# Patient Record
Sex: FEMALE | Race: WHITE | Employment: OTHER | URBAN - METROPOLITAN AREA
[De-identification: names, ages, dates, MRNs, and addresses within clinical notes are randomized per-mention and may not be internally consistent; named-entity substitution may affect disease eponyms.]

---

## 2019-04-03 ENCOUNTER — HOSPITAL ENCOUNTER (OUTPATIENT)
Age: 84
Setting detail: OBSERVATION
Discharge: HOME | End: 2019-04-03
Attending: EMERGENCY MEDICINE | Admitting: HOSPITALIST
Payer: MEDICARE

## 2019-04-03 ENCOUNTER — APPOINTMENT (OUTPATIENT)
Dept: GENERAL RADIOLOGY | Age: 84
End: 2019-04-03
Payer: MEDICARE

## 2019-04-03 VITALS
SYSTOLIC BLOOD PRESSURE: 113 MMHG | HEIGHT: 62 IN | BODY MASS INDEX: 27.55 KG/M2 | HEART RATE: 82 BPM | WEIGHT: 149.69 LBS | OXYGEN SATURATION: 96 % | TEMPERATURE: 98 F | DIASTOLIC BLOOD PRESSURE: 63 MMHG | RESPIRATION RATE: 18 BRPM

## 2019-04-03 DIAGNOSIS — R07.9 CHEST PAIN, UNSPECIFIED TYPE: Primary | ICD-10-CM

## 2019-04-03 LAB
A/G RATIO: 1.6 (ref 1.1–2.2)
ALBUMIN SERPL-MCNC: 4.2 G/DL (ref 3.4–5)
ALP BLD-CCNC: 81 U/L (ref 40–129)
ALT SERPL-CCNC: 16 U/L (ref 10–40)
ANION GAP SERPL CALCULATED.3IONS-SCNC: 12 MMOL/L (ref 3–16)
AST SERPL-CCNC: 18 U/L (ref 15–37)
BASOPHILS ABSOLUTE: 0.1 K/UL (ref 0–0.2)
BASOPHILS RELATIVE PERCENT: 0.8 %
BILIRUB SERPL-MCNC: 0.5 MG/DL (ref 0–1)
BUN BLDV-MCNC: 17 MG/DL (ref 7–20)
CALCIUM SERPL-MCNC: 9.3 MG/DL (ref 8.3–10.6)
CHLORIDE BLD-SCNC: 101 MMOL/L (ref 99–110)
CO2: 26 MMOL/L (ref 21–32)
CREAT SERPL-MCNC: 0.7 MG/DL (ref 0.6–1.2)
EOSINOPHILS ABSOLUTE: 0.4 K/UL (ref 0–0.6)
EOSINOPHILS RELATIVE PERCENT: 4.8 %
GFR AFRICAN AMERICAN: >60
GFR NON-AFRICAN AMERICAN: >60
GLOBULIN: 2.6 G/DL
GLUCOSE BLD-MCNC: 103 MG/DL (ref 70–99)
HCT VFR BLD CALC: 38.9 % (ref 36–48)
HEMOGLOBIN: 12.8 G/DL (ref 12–16)
LV EF: 85 %
LVEF MODALITY: NORMAL
LYMPHOCYTES ABSOLUTE: 2.9 K/UL (ref 1–5.1)
LYMPHOCYTES RELATIVE PERCENT: 32.5 %
MCH RBC QN AUTO: 30.5 PG (ref 26–34)
MCHC RBC AUTO-ENTMCNC: 32.8 G/DL (ref 31–36)
MCV RBC AUTO: 93.1 FL (ref 80–100)
MONOCYTES ABSOLUTE: 1.1 K/UL (ref 0–1.3)
MONOCYTES RELATIVE PERCENT: 11.8 %
NEUTROPHILS ABSOLUTE: 4.5 K/UL (ref 1.7–7.7)
NEUTROPHILS RELATIVE PERCENT: 50.1 %
PDW BLD-RTO: 13.9 % (ref 12.4–15.4)
PLATELET # BLD: 212 K/UL (ref 135–450)
PMV BLD AUTO: 7.5 FL (ref 5–10.5)
POTASSIUM REFLEX MAGNESIUM: 3.7 MMOL/L (ref 3.5–5.1)
RBC # BLD: 4.18 M/UL (ref 4–5.2)
SODIUM BLD-SCNC: 139 MMOL/L (ref 136–145)
TOTAL PROTEIN: 6.8 G/DL (ref 6.4–8.2)
TROPONIN: <0.01 NG/ML
WBC # BLD: 9 K/UL (ref 4–11)

## 2019-04-03 PROCEDURE — 6370000000 HC RX 637 (ALT 250 FOR IP): Performed by: HOSPITALIST

## 2019-04-03 PROCEDURE — 85025 COMPLETE CBC W/AUTO DIFF WBC: CPT

## 2019-04-03 PROCEDURE — 93017 CV STRESS TEST TRACING ONLY: CPT

## 2019-04-03 PROCEDURE — G0378 HOSPITAL OBSERVATION PER HR: HCPCS

## 2019-04-03 PROCEDURE — A9502 TC99M TETROFOSMIN: HCPCS | Performed by: HOSPITALIST

## 2019-04-03 PROCEDURE — 93010 ELECTROCARDIOGRAM REPORT: CPT | Performed by: INTERNAL MEDICINE

## 2019-04-03 PROCEDURE — 36415 COLL VENOUS BLD VENIPUNCTURE: CPT

## 2019-04-03 PROCEDURE — 71045 X-RAY EXAM CHEST 1 VIEW: CPT

## 2019-04-03 PROCEDURE — 80053 COMPREHEN METABOLIC PANEL: CPT

## 2019-04-03 PROCEDURE — 3430000000 HC RX DIAGNOSTIC RADIOPHARMACEUTICAL: Performed by: HOSPITALIST

## 2019-04-03 PROCEDURE — 6360000002 HC RX W HCPCS: Performed by: HOSPITALIST

## 2019-04-03 PROCEDURE — 99285 EMERGENCY DEPT VISIT HI MDM: CPT

## 2019-04-03 PROCEDURE — 96372 THER/PROPH/DIAG INJ SC/IM: CPT

## 2019-04-03 PROCEDURE — 78452 HT MUSCLE IMAGE SPECT MULT: CPT

## 2019-04-03 PROCEDURE — 84484 ASSAY OF TROPONIN QUANT: CPT

## 2019-04-03 PROCEDURE — 93005 ELECTROCARDIOGRAM TRACING: CPT | Performed by: EMERGENCY MEDICINE

## 2019-04-03 PROCEDURE — 2580000003 HC RX 258: Performed by: HOSPITALIST

## 2019-04-03 PROCEDURE — 94760 N-INVAS EAR/PLS OXIMETRY 1: CPT

## 2019-04-03 PROCEDURE — 6370000000 HC RX 637 (ALT 250 FOR IP): Performed by: EMERGENCY MEDICINE

## 2019-04-03 RX ORDER — ONDANSETRON 2 MG/ML
4 INJECTION INTRAMUSCULAR; INTRAVENOUS EVERY 6 HOURS PRN
Status: DISCONTINUED | OUTPATIENT
Start: 2019-04-03 | End: 2019-04-03 | Stop reason: HOSPADM

## 2019-04-03 RX ORDER — LEVOTHYROXINE SODIUM 0.12 MG/1
125 TABLET ORAL DAILY
Status: DISCONTINUED | OUTPATIENT
Start: 2019-04-03 | End: 2019-04-03 | Stop reason: DRUGHIGH

## 2019-04-03 RX ORDER — ASPIRIN 81 MG/1
324 TABLET, CHEWABLE ORAL ONCE
Status: COMPLETED | OUTPATIENT
Start: 2019-04-03 | End: 2019-04-03

## 2019-04-03 RX ORDER — SODIUM CHLORIDE 0.9 % (FLUSH) 0.9 %
10 SYRINGE (ML) INJECTION PRN
Status: DISCONTINUED | OUTPATIENT
Start: 2019-04-03 | End: 2019-04-03 | Stop reason: HOSPADM

## 2019-04-03 RX ORDER — ATORVASTATIN CALCIUM 40 MG/1
40 TABLET, FILM COATED ORAL NIGHTLY
Status: DISCONTINUED | OUTPATIENT
Start: 2019-04-03 | End: 2019-04-03 | Stop reason: HOSPADM

## 2019-04-03 RX ORDER — SODIUM CHLORIDE 0.9 % (FLUSH) 0.9 %
10 SYRINGE (ML) INJECTION EVERY 12 HOURS SCHEDULED
Status: DISCONTINUED | OUTPATIENT
Start: 2019-04-03 | End: 2019-04-03 | Stop reason: HOSPADM

## 2019-04-03 RX ORDER — NITROGLYCERIN 0.4 MG/1
0.4 TABLET SUBLINGUAL EVERY 5 MIN PRN
Status: DISCONTINUED | OUTPATIENT
Start: 2019-04-03 | End: 2019-04-03

## 2019-04-03 RX ORDER — ASPIRIN 81 MG/1
81 TABLET, CHEWABLE ORAL DAILY
Status: DISCONTINUED | OUTPATIENT
Start: 2019-04-04 | End: 2019-04-03 | Stop reason: HOSPADM

## 2019-04-03 RX ORDER — RALOXIFENE HYDROCHLORIDE 60 MG/1
60 TABLET, FILM COATED ORAL DAILY
Status: DISCONTINUED | OUTPATIENT
Start: 2019-04-03 | End: 2019-04-03 | Stop reason: HOSPADM

## 2019-04-03 RX ORDER — LEVOTHYROXINE SODIUM 0.03 MG/1
25 TABLET ORAL DAILY
Status: DISCONTINUED | OUTPATIENT
Start: 2019-04-03 | End: 2019-04-03 | Stop reason: HOSPADM

## 2019-04-03 RX ADMIN — ASPIRIN 81 MG 243 MG: 81 TABLET ORAL at 03:05

## 2019-04-03 RX ADMIN — TETROFOSMIN 30 MILLICURIE: 0.23 INJECTION, POWDER, LYOPHILIZED, FOR SOLUTION INTRAVENOUS at 11:17

## 2019-04-03 RX ADMIN — RALOXIFENE HYDROCHLORIDE 60 MG: 60 TABLET, FILM COATED ORAL at 13:01

## 2019-04-03 RX ADMIN — LEVOTHYROXINE SODIUM 25 MCG: 25 TABLET ORAL at 13:01

## 2019-04-03 RX ADMIN — NITROGLYCERIN 0.4 MG: 0.4 TABLET, ORALLY DISINTEGRATING SUBLINGUAL at 04:04

## 2019-04-03 RX ADMIN — ENOXAPARIN SODIUM 40 MG: 40 INJECTION SUBCUTANEOUS at 13:02

## 2019-04-03 RX ADMIN — SODIUM CHLORIDE, PRESERVATIVE FREE 10 ML: 5 INJECTION INTRAVENOUS at 11:07

## 2019-04-03 RX ADMIN — TETROFOSMIN 10 MILLICURIE: 0.23 INJECTION, POWDER, LYOPHILIZED, FOR SOLUTION INTRAVENOUS at 09:49

## 2019-04-03 RX ADMIN — NITROGLYCERIN 0.4 MG: 0.4 TABLET, ORALLY DISINTEGRATING SUBLINGUAL at 03:57

## 2019-04-03 ASSESSMENT — PAIN DESCRIPTION - DESCRIPTORS
DESCRIPTORS: NAGGING
DESCRIPTORS: PRESSURE
DESCRIPTORS: PRESSURE

## 2019-04-03 ASSESSMENT — PAIN DESCRIPTION - PAIN TYPE
TYPE: ACUTE PAIN

## 2019-04-03 ASSESSMENT — PAIN DESCRIPTION - LOCATION
LOCATION: CHEST
LOCATION: CHEST
LOCATION: STERNUM

## 2019-04-03 ASSESSMENT — PAIN DESCRIPTION - ORIENTATION: ORIENTATION: LOWER

## 2019-04-03 ASSESSMENT — PAIN SCALES - GENERAL
PAINLEVEL_OUTOF10: 2
PAINLEVEL_OUTOF10: 1
PAINLEVEL_OUTOF10: 3
PAINLEVEL_OUTOF10: 2
PAINLEVEL_OUTOF10: 0

## 2019-04-03 ASSESSMENT — HEART SCORE: ECG: 1

## 2019-04-03 NOTE — ED NOTES
Report called to [facility] on PCU. Will transport when room ready.      Conor Barcenas RN  04/03/19 9225

## 2019-04-03 NOTE — H&P
noted.  Peripheral pulses and capillary refill is intact. CBC:   Recent Labs     04/03/19 0312   WBC 9.0   HGB 12.8        BMP:    Recent Labs     04/03/19 0312      K 3.7      CO2 26   BUN 17   CREATININE 0.7   GLUCOSE 103*     Hepatic:   Recent Labs     04/03/19 0312   AST 18   ALT 16   BILITOT 0.5   ALKPHOS 81     Troponin:   Recent Labs     04/03/19 0312   TROPONINI <0.01     BNP: No results for input(s): BNP in the last 72 hours. INR: No results for input(s): INR in the last 72 hours. No results found for: LABA1C        No results for input(s): CKTOTAL in the last 72 hours. -----------------------------------------------------------------  XR CHEST PORTABLE   No acute cardiopulmonary disease. EKG  Normal sinus rhythm        Assessment / Plan     Chest pain, unspecified R07.9  Atypical chest pain  Admit patient to telemetry  Trend cardiac enzymes  If patient rules out for MI   Cardiac stress testing in a.m. Start patient on PPI  Continue patient on aspirin, beta blocker and statin    Pain management  R52  Continue with the IV and oral pain medication          DVT and GI prophylaxis      Full Code      Blanquita Desai M.D    This note was transcribed using [id number]. Please disregard any translational errors.

## 2019-04-03 NOTE — ED PROVIDER NOTES
[facility]  eMERGENCY dEPARTMENT eNCOUnter      Pt Name: Tank Barrett  MRN: [id number]  [place] 1934  Date of evaluation: 4/3/2019  Provider: Dwayne Corona, [address] [place] [address]       Chief Complaint   Patient presents with    Palpitations    Chest Pain         HISTORY OF PRESENT ILLNESS   (Location/Symptom, Timing/Onset, Context/Setting, Quality, Duration, Modifying Factors, Severity)  Note limiting factors. Tank Barrett is a 80 y.o. female who presents to the emergency department with a complaint of midsternal chest pressure at 2230 hrs. while getting ready for bed. She currently rates it a 3/10 intensity. She denies any associated diaphoresis or shortness of breath. Earlier in the evening at around 8 PM she noticed that her heart was possibly beating irregularly and maybe a little bit faster than usual. She has remote history of atrial fibrillation 18 years ago. She is not currently anticoagulated. She does take aspirin 81 mg daily. She denies any prior history of coronary artery disease, hypertension, hyperlipidemia or diabetes. She denies any personal or family history of thromboembolic disease. No leg or calf pain. No recent travel. She denies any cough or cold symptoms. No fever or chills. No vomiting or diarrhea. No abdominal pain. HPI    Nursing Notes were reviewed. REVIEW OF SYSTEMS    (2-9 systems for level 4, 10 or more for level 5)       Constitutional: Negative for fever or chills. HENT: Negative for rhinorrhea and sore throat. Eyes: Negative for redness or drainage. Respiratory: Negative for shortness of breath or dyspnea on exertion. Gastrointestinal: Negative for abdominal pain. Negative for vomiting or diarrhea. Genitourinary: Negative for flank pain. Negative for dysuria. Negative for hematuria. Neurological: Negative for headache. Musculoskeletal:  Negative edema. Hematological: Negative for adenopathy. activity: Not on file   Other Topics Concern    Not on file   Social History Narrative    Not on file       SCREENINGS             PHYSICAL EXAM    (up to 7 for level 4, 8 or more for level 5)     ED Triage Vitals [04/03/19 0252]   BP Temp Temp src Pulse Resp SpO2 Height Weight   (!) 153/84 -- -- 96 14 98 % 5' 2\" (1.575 m) 149 lb 11.1 oz (67.9 kg)       Physical Exam   Constitutional: Awake and alert and oriented to person place and time. No apparent distress. Head: No visible evidence of trauma. Normocephalic. Eyes: Pupils equal and reactive. No photophobia. Conjunctiva normal.    HENT: Oral mucosa moist.  Airway patent. Neck:  Soft and supple. Heart:  Regular rate and rhythm. No murmur. Lungs:  Clear to auscultation. No wheezes, rales, or ronchi. No conversational dyspnea or accessory muscle use. Chest: Chest wall non-tender. No evidence of trauma. Abdomen:  Soft, nondistended, bowel sounds present. Nontender. No guarding rigidity or rebound. No masses. Musculoskeletal: Extremities non-tender with full range of motion. radial and dorsalis pedis pulses were equal bilaterally. No calf tenderness erythema or edema. Neurological: Alert and oriented x 3. Speech clear. Cranial nerves II-XII intact. No facial droop. No acute focal motor or sensory deficits. Skin: Skin is warm and dry. No rash. Lymphatic:  No lympadenopathy. Psychiatric: Normal mood and affect. Behavior is normal.         DIAGNOSTIC RESULTS     EKG: All EKG's are interpreted by the Emergency Department Physician who either signs or Co-signs this chart in the absence of a cardiologist.    Normal sinus rhythm. Rate 85. MN interval 144 ms.  ms. R axis -63°. There is no ST elevation. Poor R-wave progression was noted. Q waves noted in the anterior septal leads. No acute change.     RADIOLOGY:   Non-plain film images such as CT, Ultrasound and MRI are read by the radiologist. Plain radiographic images are visualized and preliminarily interpreted by the emergency physician with the below findings:        Interpretation per the Radiologist below, if available at the time of this note:    XR CHEST PORTABLE   Final Result   No acute cardiopulmonary disease. ED BEDSIDE ULTRASOUND:   Performed by ED Physician - none    LABS:  Labs Reviewed   COMPREHENSIVE METABOLIC PANEL W/ REFLEX TO MG FOR LOW K - Abnormal; Notable for the following components:       Result Value    Glucose 103 (*)     All other components within normal limits    Narrative:     Performed at:  [facility] [address] [place], [place] [address]   Phone [phone]   CBC WITH AUTO DIFFERENTIAL    Narrative:     Performed at:  [address] [address]   Phone [phone]   TROPONIN    Narrative:     Performed at:  [address] [address]   Phone [phone]       All other labs were within normal range or not returned as of this dictation. EMERGENCY DEPARTMENT COURSE and DIFFERENTIAL DIAGNOSIS/MDM:   Vitals:    Vitals:    04/03/19 0252 04/03/19 0400   BP: (!) 153/84 129/80   Pulse: 96 90   Resp: 14 19   SpO2: 98% 100%   Weight: 149 lb 11.1 oz (67.9 kg)    Height: 5' 2\" (1.575 m)        The patient presented with midsternal chest pressure that began at 2230 hrs., possibly associated with some palpitations. She is currently in sinus rhythm. At time of my examination she rated her chest pressure 3/10 intensity. She was given aspirin 324 g by mouth and was given a trial of nitroglycerin. EKG reveals normal sinus rhythm with no acute change. MDM      REASSESSMENT          The patient was reexamined. She had relief of her chest discomfort with nitroglycerin. Her symptoms are suspicious for ischemic chest pain. She is currently pain-free. Chest x-ray is unremarkable.  Initial troponin is negative. She will be admitted for further treatment and evaluation of her chest discomfort. A call was placed to the hospitalist on-call for admission. CRITICAL CARE TIME   Total Critical Care time was 0 minutes, excluding separately reportable procedures. There was a high probability of clinically significant/life threatening deterioration in the patient's condition which required my urgent intervention. CONSULTS:  None    PROCEDURES:  Unless otherwise noted below, none     Procedures        FINAL IMPRESSION      1. Chest pain, unspecified type          DISPOSITION/PLAN   DISPOSITION        PATIENT REFERRED TO:  No follow-up provider specified. DISCHARGE MEDICATIONS:  New Prescriptions    No medications on file     Controlled Substances Monitoring:     No flowsheet data found. (Please note that portions of this note were completed with a voice recognition program.  Efforts were made to edit the dictations but occasionally words are mis-transcribed. )    [id number] Isaiah Corona DO (electronically signed)  Attending Emergency Physician          Dave Robbins DO  04/03/19 3969

## 2019-04-03 NOTE — DISCHARGE SUMMARY
Hospital Medicine Discharge Summary      Patient ID: Tyson Bean [id number]     Patient's PCP: Cmlisbet Velarde    Admit Date: 4/3/2019     Discharge Date:       Admitting Physician: Kwesi Glaeas MD    Discharge Physician: Chanell Villanueva MD     Discharge Diagnoses: Active Hospital Problems    Diagnosis Date Noted    Chest pain [R07.9] 04/03/2019         The patient was seen and examined on the day of discharge and this discharge summary is in conjunction with any daily progress note from day of discharge. HOSPITAL COURSE    Patient demographics:  The patient  Bhakti Velarde is a 80 y.o. female     Significant past medical history:   Patient Active Problem List   Diagnosis    Cancer of breast, female [facility])    Chest pain         Presenting symptoms:  Chest pain    Diagnostic workup:      CONSULTS DURING ADMISSION :   None      Patient was diagnosed with:  Noncardiac chest pain      Treatment while inpatient:  Pt presented with chest pain. Pt  was ruled out for acs with ekg and cardiac enzyme criteria. Pt also underwent cardiac stress testing which did not show any evidence of cardiac ischemia. If chest pain continues patient should follow-up with the primary care physician      Discharge Condition:  stable     Discharged to:  Home     Activity:   as tolerated: Follow Up: Follow-up with PCP in 1-2 weeks                                                       Labs:  For convenience and continuity at follow-up the following most recent labs are provided:      CBC:   Lab Results   Component Value Date    WBC 9.0 04/03/2019    HGB 12.8 04/03/2019    HCT 38.9 04/03/2019     04/03/2019       RENAL:   Lab Results   Component Value Date     04/03/2019    K 3.7 04/03/2019     04/03/2019    CO2 26 04/03/2019    BUN 17 04/03/2019    CREATININE 0.7 04/03/2019           Discharge Medications:    Anuel Suazo   Home Medication Instructions [id number]    Printed

## 2019-04-03 NOTE — PLAN OF CARE
Problem: Pain:  Description  Pain management should include both nonpharmacologic and pharmacologic interventions. Goal: Pain level will decrease  Description  Pain level will decrease  Outcome: Ongoing  Goal: Control of acute pain  Description  Control of acute pain  Outcome: Ongoing  Goal: Control of chronic pain  Description  Control of chronic pain  Outcome: Ongoing     Problem: SAFETY  Goal: Free from accidental physical injury  Outcome: Ongoing  Goal: Free from intentional harm  Outcome: Ongoing     Problem: DAILY CARE  Goal: Daily care needs are met  Outcome: Ongoing     Problem: PAIN  Goal: Patient's pain/discomfort is manageable  Outcome: Ongoing     Problem: SKIN INTEGRITY  Goal: Skin integrity is maintained or improved  Outcome: Ongoing     Problem: KNOWLEDGE DEFICIT  Goal: Patient/S.O. demonstrates understanding of disease process, treatment plan, medications, and discharge instructions.   Outcome: Ongoing     Problem: DISCHARGE BARRIERS  Goal: Patient's continuum of care needs are met  Outcome: Ongoing

## 2019-04-04 LAB
EKG ATRIAL RATE: 85 BPM
EKG DIAGNOSIS: NORMAL
EKG P AXIS: 59 DEGREES
EKG P-R INTERVAL: 144 MS
EKG Q-T INTERVAL: 380 MS
EKG QRS DURATION: 76 MS
EKG QTC CALCULATION (BAZETT): 452 MS
EKG R AXIS: -63 DEGREES
EKG T AXIS: 54 DEGREES
EKG VENTRICULAR RATE: 85 BPM

## 2020-07-06 ENCOUNTER — OFFICE VISIT (OUTPATIENT)
Dept: PRIMARY CARE CLINIC | Age: 85
End: 2020-07-06
Payer: MEDICARE

## 2020-07-06 PROCEDURE — G8428 CUR MEDS NOT DOCUMENT: HCPCS | Performed by: NURSE PRACTITIONER

## 2020-07-06 PROCEDURE — G8421 BMI NOT CALCULATED: HCPCS | Performed by: NURSE PRACTITIONER

## 2020-07-06 PROCEDURE — 99211 OFF/OP EST MAY X REQ PHY/QHP: CPT | Performed by: NURSE PRACTITIONER

## 2020-07-08 LAB
SARS-COV-2: NOT DETECTED
SOURCE: NORMAL

## 2020-07-08 NOTE — PROGRESS NOTES
[address] time_______1100_____        Surgery time____________    Take the following medications with a sip of water: Follow your Doctor's pre procedure instructions regarding medications    Do not eat or drink anything after 12:00 midnight prior to your surgery. This includes water chewing gum, mints and ice chips. You may brush your teeth and gargle the morning of your surgery, but do not swallow the water     Please see your family doctor/pediatrician for a history and physical and/or concerning medications. Bring any test results/reports from your physicians office. If you are under the care of a heart doctor or specialist doctor, please be aware that you may be asked to them for clearance    You may be asked to stop blood thinners such as Coumadin, Plavix, Fragmin, Lovenox, etc., or any anti-inflammatories such as:  Aspirin, Ibuprofen, Advil, Naproxen prior to your surgery. We also ask that you stop any OTC medications such as fish oil, vitamin E, glucosamine, garlic, Multivitamins, COQ 10, etc.    We ask that you do not smoke 24 hours prior to surgery  We ask that you do not  drink any alcoholic beverages 24 hours prior to surgery     You must make arrangements for a responsible adult to take you home after your surgery. For your safety you will not be allowed to leave alone or drive yourself home. Your surgery will be cancelled if you do not have a ride home. Also for your safety, it is strongly suggested that someone stay with you the first 24 hours after your surgery. A parent or legal guardian must accompany a child scheduled for surgery and plan to stay at the hospital until the child is discharged. Please do not bring other children with you. For your comfort, please wear simple loose fitting clothing to the hospital.  Please do not bring valuables.     Do not wear any make-up or nail polish on your fingers or toes      For your safety, please do not wear any jewelry or body piercing's on the day of surgery. All jewelry must be removed. If you have dentures, they will be removed before going to operating room. For your convenience, we will provide you with a container. If you wear contact lenses or glasses, they will be removed, please bring a case for them. If you have a living will and a durable power of  for healthcare, please bring in a copy. As part of our patient safety program to minimize surgical site infections, we ask you to do the following:    · Please notify your surgeon if you develop any illness between         now and the  day of your surgery. · This includes a cough, cold, fever, sore throat, nausea,         or vomiting, and diarrhea, etc.  ·  Please notify your surgeon if you experience dizziness, shortness         of breath or blurred vision between now and the time of your surgery. Do not shave your operative site 96 hours prior to surgery. For face and neck surgery, men may use an electric razor 48 hours   prior to surgery. You may shower the night before surgery or the morning of   your surgery with an antibacterial soap. You will need to bring a photo ID and insurance card    [facility] has an onsite pharmacy, would you like to utilize our pharmacy     If you will be staying overnight and use a C-pap machine, please bring   your C-pap to hospital     Our goal is to provide you with excellent care, therefore, visitors will be limited to two(2) in the room at a time so that we may focus on providing this care for you. Please contact pre-admission testing if you have any further questions. [facility] phone number:  [phone] PAT fax number:  [phone]  Please note these are generalized instructions for all surgical cases, you may be provided with more specific instructions according to your surgery.   Preoperative Screening for Elective Surgery/Invasive Procedures While COVID-19 present in the community     Have you tested positive or have been told to self-isolate for COVID-19 like symptoms within the past 28 days? no   Do you currently have any of the following symptoms? o Fever >100.0 F or 99.9 F in immunocompromised patients?no  o New onset cough, shortness of breath or difficulty breathing?no  o New onset sore throat, myalgia (muscle aches and pains), headache, loss of taste/smell or diarrhea? no   Have you had a potential exposure to COVID-19 within the past 14 days by:  o Close contact with a confirmed case?no  o Close contact with a healthcare worker,  or essential infrastructure worker (grocery store, [employer], gas station, public utilities or transportation)? noDo you reside in a congregate setting such as; skilled nursing facility, adult home, correctional facility, homeless shelter or other institutional setting?no  o Have you had recent travel to a known COVID-19 hotspot? no    Indicate if the patient has a positive screen by answering yes to one or more of the above questions. Patients who test positive or screen positive prior to surgery or on the day of surgery should be evaluated in conjunction with the surgeon/proceduralist/anesthesiologist to determine the urgency of the procedure.

## 2020-07-09 ENCOUNTER — ANESTHESIA EVENT (OUTPATIENT)
Dept: OPERATING ROOM | Age: 85
End: 2020-07-09
Payer: MEDICARE

## 2020-07-10 ENCOUNTER — ANESTHESIA (OUTPATIENT)
Dept: OPERATING ROOM | Age: 85
End: 2020-07-10
Payer: MEDICARE

## 2020-07-10 ENCOUNTER — HOSPITAL ENCOUNTER (OUTPATIENT)
Age: 85
Setting detail: OUTPATIENT SURGERY
Discharge: HOME | End: 2020-07-10
Attending: PODIATRIST | Admitting: PODIATRIST
Payer: MEDICARE

## 2020-07-10 ENCOUNTER — APPOINTMENT (OUTPATIENT)
Dept: GENERAL RADIOLOGY | Age: 85
End: 2020-07-10
Attending: PODIATRIST
Payer: MEDICARE

## 2020-07-10 VITALS
OXYGEN SATURATION: 100 % | RESPIRATION RATE: 18 BRPM | HEART RATE: 66 BPM | SYSTOLIC BLOOD PRESSURE: 124 MMHG | BODY MASS INDEX: 26.87 KG/M2 | HEIGHT: 62 IN | DIASTOLIC BLOOD PRESSURE: 87 MMHG | WEIGHT: 146 LBS | TEMPERATURE: 97.2 F

## 2020-07-10 VITALS
TEMPERATURE: 97.7 F | DIASTOLIC BLOOD PRESSURE: 62 MMHG | OXYGEN SATURATION: 99 % | SYSTOLIC BLOOD PRESSURE: 125 MMHG | RESPIRATION RATE: 11 BRPM

## 2020-07-10 PROCEDURE — 3700000000 HC ANESTHESIA ATTENDED CARE: Performed by: PODIATRIST

## 2020-07-10 PROCEDURE — 2580000003 HC RX 258: Performed by: ANESTHESIOLOGY

## 2020-07-10 PROCEDURE — 7100000000 HC PACU RECOVERY - FIRST 15 MIN: Performed by: PODIATRIST

## 2020-07-10 PROCEDURE — 2709999900 HC NON-CHARGEABLE SUPPLY: Performed by: PODIATRIST

## 2020-07-10 PROCEDURE — 2720000010 HC SURG SUPPLY STERILE: Performed by: PODIATRIST

## 2020-07-10 PROCEDURE — 2580000003 HC RX 258: Performed by: PODIATRIST

## 2020-07-10 PROCEDURE — 6360000002 HC RX W HCPCS: Performed by: PODIATRIST

## 2020-07-10 PROCEDURE — 2500000003 HC RX 250 WO HCPCS: Performed by: PODIATRIST

## 2020-07-10 PROCEDURE — 6360000002 HC RX W HCPCS: Performed by: NURSE ANESTHETIST, CERTIFIED REGISTERED

## 2020-07-10 PROCEDURE — 3600000014 HC SURGERY LEVEL 4 ADDTL 15MIN: Performed by: PODIATRIST

## 2020-07-10 PROCEDURE — 7100000010 HC PHASE II RECOVERY - FIRST 15 MIN: Performed by: PODIATRIST

## 2020-07-10 PROCEDURE — 3700000001 HC ADD 15 MINUTES (ANESTHESIA): Performed by: PODIATRIST

## 2020-07-10 PROCEDURE — 73630 X-RAY EXAM OF FOOT: CPT

## 2020-07-10 PROCEDURE — C1713 ANCHOR/SCREW BN/BN,TIS/BN: HCPCS | Performed by: PODIATRIST

## 2020-07-10 PROCEDURE — 7100000011 HC PHASE II RECOVERY - ADDTL 15 MIN: Performed by: PODIATRIST

## 2020-07-10 PROCEDURE — 3600000004 HC SURGERY LEVEL 4 BASE: Performed by: PODIATRIST

## 2020-07-10 PROCEDURE — 2500000003 HC RX 250 WO HCPCS: Performed by: NURSE ANESTHETIST, CERTIFIED REGISTERED

## 2020-07-10 PROCEDURE — 7100000001 HC PACU RECOVERY - ADDTL 15 MIN: Performed by: PODIATRIST

## 2020-07-10 DEVICE — SINGLE TROCAR WIRE
Type: IMPLANTABLE DEVICE | Site: FOOT | Status: FUNCTIONAL
Brand: CHARLOTTE

## 2020-07-10 DEVICE — MUC SCREW
Type: IMPLANTABLE DEVICE | Site: FOOT | Status: FUNCTIONAL
Brand: CHARLOTTE

## 2020-07-10 DEVICE — WIRE ORTH 1.1MM DIA 229MM SMOOTH DBL BAYNT TIP S STL K: Type: IMPLANTABLE DEVICE | Site: FOOT | Status: FUNCTIONAL

## 2020-07-10 RX ORDER — MEPERIDINE HYDROCHLORIDE 25 MG/ML
12.5 INJECTION INTRAMUSCULAR; INTRAVENOUS; SUBCUTANEOUS EVERY 5 MIN PRN
Status: DISCONTINUED | OUTPATIENT
Start: 2020-07-10 | End: 2020-07-10 | Stop reason: HOSPADM

## 2020-07-10 RX ORDER — PHENYLEPHRINE HCL IN 0.9% NACL 1 MG/10 ML
SYRINGE (ML) INTRAVENOUS PRN
Status: DISCONTINUED | OUTPATIENT
Start: 2020-07-10 | End: 2020-07-10 | Stop reason: SDUPTHER

## 2020-07-10 RX ORDER — FENTANYL CITRATE 50 UG/ML
25 INJECTION, SOLUTION INTRAMUSCULAR; INTRAVENOUS EVERY 5 MIN PRN
Status: DISCONTINUED | OUTPATIENT
Start: 2020-07-10 | End: 2020-07-10 | Stop reason: HOSPADM

## 2020-07-10 RX ORDER — MORPHINE SULFATE 2 MG/ML
2 INJECTION, SOLUTION INTRAMUSCULAR; INTRAVENOUS EVERY 5 MIN PRN
Status: DISCONTINUED | OUTPATIENT
Start: 2020-07-10 | End: 2020-07-10 | Stop reason: HOSPADM

## 2020-07-10 RX ORDER — ONDANSETRON 2 MG/ML
INJECTION INTRAMUSCULAR; INTRAVENOUS PRN
Status: DISCONTINUED | OUTPATIENT
Start: 2020-07-10 | End: 2020-07-10 | Stop reason: SDUPTHER

## 2020-07-10 RX ORDER — OXYCODONE HYDROCHLORIDE 10 MG/1
10 TABLET ORAL PRN
Status: DISCONTINUED | OUTPATIENT
Start: 2020-07-10 | End: 2020-07-10 | Stop reason: HOSPADM

## 2020-07-10 RX ORDER — SODIUM CHLORIDE 9 MG/ML
INJECTION, SOLUTION INTRAVENOUS CONTINUOUS
Status: DISCONTINUED | OUTPATIENT
Start: 2020-07-10 | End: 2020-07-10 | Stop reason: HOSPADM

## 2020-07-10 RX ORDER — MAGNESIUM HYDROXIDE 1200 MG/15ML
LIQUID ORAL CONTINUOUS PRN
Status: COMPLETED | OUTPATIENT
Start: 2020-07-10 | End: 2020-07-10

## 2020-07-10 RX ORDER — OXYCODONE HYDROCHLORIDE 5 MG/1
5 TABLET ORAL PRN
Status: DISCONTINUED | OUTPATIENT
Start: 2020-07-10 | End: 2020-07-10 | Stop reason: HOSPADM

## 2020-07-10 RX ORDER — DEXAMETHASONE SODIUM PHOSPHATE 4 MG/ML
INJECTION, SOLUTION INTRA-ARTICULAR; INTRALESIONAL; INTRAMUSCULAR; INTRAVENOUS; SOFT TISSUE
Status: COMPLETED | OUTPATIENT
Start: 2020-07-10 | End: 2020-07-10

## 2020-07-10 RX ORDER — DEXAMETHASONE SODIUM PHOSPHATE 4 MG/ML
INJECTION, SOLUTION INTRA-ARTICULAR; INTRALESIONAL; INTRAMUSCULAR; INTRAVENOUS; SOFT TISSUE PRN
Status: DISCONTINUED | OUTPATIENT
Start: 2020-07-10 | End: 2020-07-10 | Stop reason: SDUPTHER

## 2020-07-10 RX ORDER — BUPIVACAINE HYDROCHLORIDE 5 MG/ML
INJECTION, SOLUTION EPIDURAL; INTRACAUDAL
Status: COMPLETED | OUTPATIENT
Start: 2020-07-10 | End: 2020-07-10

## 2020-07-10 RX ORDER — SODIUM CHLORIDE 0.9 % (FLUSH) 0.9 %
10 SYRINGE (ML) INJECTION EVERY 12 HOURS SCHEDULED
Status: DISCONTINUED | OUTPATIENT
Start: 2020-07-10 | End: 2020-07-10 | Stop reason: HOSPADM

## 2020-07-10 RX ORDER — LIDOCAINE HYDROCHLORIDE 20 MG/ML
INJECTION, SOLUTION EPIDURAL; INFILTRATION; INTRACAUDAL; PERINEURAL PRN
Status: DISCONTINUED | OUTPATIENT
Start: 2020-07-10 | End: 2020-07-10 | Stop reason: SDUPTHER

## 2020-07-10 RX ORDER — FENTANYL CITRATE 50 UG/ML
50 INJECTION, SOLUTION INTRAMUSCULAR; INTRAVENOUS EVERY 5 MIN PRN
Status: DISCONTINUED | OUTPATIENT
Start: 2020-07-10 | End: 2020-07-10 | Stop reason: HOSPADM

## 2020-07-10 RX ORDER — SODIUM CHLORIDE 0.9 % (FLUSH) 0.9 %
10 SYRINGE (ML) INJECTION PRN
Status: DISCONTINUED | OUTPATIENT
Start: 2020-07-10 | End: 2020-07-10 | Stop reason: HOSPADM

## 2020-07-10 RX ORDER — PROPOFOL 10 MG/ML
INJECTION, EMULSION INTRAVENOUS CONTINUOUS PRN
Status: DISCONTINUED | OUTPATIENT
Start: 2020-07-10 | End: 2020-07-10 | Stop reason: SDUPTHER

## 2020-07-10 RX ORDER — ONDANSETRON 2 MG/ML
4 INJECTION INTRAMUSCULAR; INTRAVENOUS
Status: DISCONTINUED | OUTPATIENT
Start: 2020-07-10 | End: 2020-07-10 | Stop reason: HOSPADM

## 2020-07-10 RX ORDER — MORPHINE SULFATE 2 MG/ML
1 INJECTION, SOLUTION INTRAMUSCULAR; INTRAVENOUS EVERY 5 MIN PRN
Status: DISCONTINUED | OUTPATIENT
Start: 2020-07-10 | End: 2020-07-10 | Stop reason: HOSPADM

## 2020-07-10 RX ADMIN — ONDANSETRON 4 MG: 2 INJECTION INTRAMUSCULAR; INTRAVENOUS at 13:55

## 2020-07-10 RX ADMIN — CEFAZOLIN 2 G: 10 INJECTION, POWDER, FOR SOLUTION INTRAVENOUS at 12:43

## 2020-07-10 RX ADMIN — Medication 50 MCG: at 13:07

## 2020-07-10 RX ADMIN — Medication 50 MCG: at 13:15

## 2020-07-10 RX ADMIN — DEXAMETHASONE SODIUM PHOSPHATE 4 MG: 4 INJECTION, SOLUTION INTRAMUSCULAR; INTRAVENOUS at 12:51

## 2020-07-10 RX ADMIN — PROPOFOL 200 MCG/KG/MIN: 10 INJECTION, EMULSION INTRAVENOUS at 12:41

## 2020-07-10 RX ADMIN — Medication 50 MCG: at 14:07

## 2020-07-10 RX ADMIN — LIDOCAINE HYDROCHLORIDE 80 MG: 20 INJECTION, SOLUTION EPIDURAL; INFILTRATION; INTRACAUDAL; PERINEURAL at 12:41

## 2020-07-10 RX ADMIN — SODIUM CHLORIDE: 9 INJECTION, SOLUTION INTRAVENOUS at 11:38

## 2020-07-10 RX ADMIN — SODIUM CHLORIDE: 9 INJECTION, SOLUTION INTRAVENOUS at 14:07

## 2020-07-10 ASSESSMENT — PULMONARY FUNCTION TESTS
PIF_VALUE: 0

## 2020-07-10 ASSESSMENT — PAIN SCALES - GENERAL
PAINLEVEL_OUTOF10: 0
PAINLEVEL_OUTOF10: 0

## 2020-07-10 ASSESSMENT — PAIN - FUNCTIONAL ASSESSMENT: PAIN_FUNCTIONAL_ASSESSMENT: 0-10

## 2020-07-10 NOTE — ANESTHESIA POSTPROCEDURE EVALUATION
Department of Anesthesiology  Postprocedure Note    Patient: Elmer Garcia  MRN: [id number]  YOB: 1934  Date of evaluation: 7/10/2020  Time:  2:57 PM     Procedure Summary     Date:  07/10/20 Room / Location:  [facility] OR [address] [place] [address]    Anesthesia Start:  9536 Anesthesia Stop:  0584    Procedure:  MODIFIED NILDA SCARF BUNIONECTOMY RIGHT FOOT, DISTAL METATARSAL OSTEOTOMY SECOND RIGHT, FLEXOR TENOTOMY THIRD AND FOURTH RIGHT, PROXIMAL INTERPROLONGED JOINT ARTHRODISIS RIGHT FOOT (Right ) Diagnosis:       Hallux valgus, right      Hammer toe of right foot      Toe pain, right      (HALLUX VALGUS RIGHT FOOT, HAMMERTOES SECOND, THIRD, FOURTH RIGHT FOOT, PAIN RIGHT TOES)    Surgeon:  Nat Red DPM Responsible Provider:  Zoey Evans MD    Anesthesia Type:  MAC ASA Status:  3          Anesthesia Type: MAC    Penny Phase I: Penny Score: 7    Penny Phase II:      Last vitals: Reviewed and per EMR flowsheets.        Anesthesia Post Evaluation    Patient location during evaluation: PACU  Patient participation: complete - patient participated  Level of consciousness: awake and alert  Pain score: 0  Airway patency: patent  Nausea & Vomiting: no nausea and no vomiting  Complications: no  Cardiovascular status: blood pressure returned to baseline  Respiratory status: acceptable  Hydration status: euvolemic

## 2020-07-10 NOTE — PROGRESS NOTES
Pt up to chair, tolerating well. VSS. Dressing clean dry and intact. Pt states no pain. Continue to monitor.

## 2020-07-10 NOTE — PROGRESS NOTES
Pt to ph 2 from PACU. VSS. Tanvir Fell. Dressing clean dry and intact. Pt states no pain. Warm leg. Continue to monitor.

## 2020-07-10 NOTE — BRIEF OP NOTE
Brief Postoperative Note      Patient: Doris See  YOB: 1934  MRN: [id number]    Date of Procedure: 7/10/2020    Pre-Op Diagnosis: HALLUX VALGUS RIGHT FOOT,  ELONGATED 2ND MT RT, HAMMERTOES SECOND, THIRD, FOURTH, FIFTH RIGHT FOOT, PAIN RIGHT TOES    Post-Op Diagnosis: Same       Procedure(s):  MODIFIED NILDA SCARF BUNIONECTOMY RIGHT FOOT, DISTAL METATARSAL OSTEOTOMY SECOND RIGHT, FLEXOR TENOTOMY THIRD AND FOURTH RIGHT, PROXIMAL INTERPROLONGED JOINT ARTHRODISIS RIGHT FOOT    Surgeon(s):  Anca Lockhart DPM    Assistant:  Surgical Assistant: Nichole Ramos    Anesthesia: Monitor Anesthesia Care    Estimated Blood Loss (mL): Minimal    Complications: None    Specimens:   * No specimens in log *    Implants:  Implant Name Type Inv. Item Serial No.  Lot No. LRB No. Used Action   SCREW COMPRSS MULTIUSE 3.0X12MM Screw/Plate/Nail/Kael SCREW COMPRSS MULTIUSE 3.0X12MM  [employer]  Right 1 Implanted   SCREW COMPRSS MULTIUSE 3.0X14MM Screw/Plate/Nail/Kael SCREW COMPRSS MULTIUSE 3.0X14MM  [employer]  Right 1 Implanted   SCREW COMPRSS MULTIUSE 3.0X22MM Screw/Plate/Nail/Kael SCREW COMPRSS MULTIUSE 3.0X22MM  [employer]  Right 1 Implanted   IMPL KWIRE FIXATION 1MM 150MM  [employer] Screw/Plate/Nail/Kael IMPL KWIRE FIXATION 1MM 150MM  [employer]  [employer]  Right 3 Implanted   IMPL KWIRE FIXATION 1.1MM 22. 9CM ST Screw/Plate/Nail/Kael IMPL KWIRE FIXATION 1.1MM 22. 9CM ST  [facility] [id number] Right 1 Implanted         Drains: * No LDAs found *    Findings: AS EXPECTED.     Electronically signed by Anca Lockhart DPM on 7/10/2020 at 2:19 PM

## 2020-07-10 NOTE — PROGRESS NOTES
Pt wakes easy to v/o. VSS. Pt denies pain and nausea. Dressing RLE cdi. Ice applied. Pt stable ready for phase 2.

## 2020-07-10 NOTE — PROGRESS NOTES
Pt arrived to pacu from or asleep. VSS on monitor. O2 on 4L nc. Dressing right foot cdi elevated ice applied. Good circ checks to right LE. Pt asleep in bed.

## 2020-07-10 NOTE — ANESTHESIA PRE PROCEDURE
Department of Anesthesiology  Preprocedure Note       Name:  Diana Pascual   Age:  80 y.o.  :  1934                                          MRN:  [id number]         Date:  7/10/2020      Surgeon: Cris Hernandez):  Sendy Haile DPM    Procedure: Procedure(s):  MODIFIED NILDA SCARF BUNIONECTOMY RIGHT FOOT, DISTAL METATARSAL OSTEOTOMY SECOND RIGHT, FLEXOR TENOTOMY THIRD AND FOURTH RIGHT  PROXIMAL INTERPROLONGED JOINT ARTHRODISIS RIGHT FOOT    Medications prior to admission:   Prior to Admission medications    Medication Sig Start Date End Date Taking? Authorizing Provider   levothyroxine (SYNTHROID) 25 MCG tablet Take 25 mcg by mouth daily    Yes Historical Provider, MD   raloxifene (EVISTA) 60 MG tablet Take 60 mg by mouth daily. Yes Historical Provider, MD   aspirin 81 MG EC tablet Take 81 mg by mouth daily.       Historical Provider, MD       Current medications:    Current Facility-Administered Medications   Medication Dose Route Frequency Provider Last Rate Last Dose    0.9 % sodium chloride infusion   Intravenous Continuous Kartik Bateman MD 75 mL/hr at 07/10/20 1138      sodium chloride flush 0.9 % injection 10 mL  10 mL Intravenous 2 times per day Kartik Bateman MD        sodium chloride flush 0.9 % injection 10 mL  10 mL Intravenous PRN Kartik Bateman MD        ceFAZolin (ANCEF) 2 g in dextrose 5 % 100 mL IVPB  2 g Intravenous Once Sendy Haile DPM           Allergies:  No Known Allergies    Problem List:    Patient Active Problem List   Diagnosis Code    Cancer of breast, female [facility]) C50.919    Chest pain R07.9       Past Medical History:        Diagnosis Date    Breast cancer ([address]     right    Cancer ([address]     Thyroid disease        Past Surgical History:        Procedure Laterality Date    BREAST LUMPECTOMY Right     COLONOSCOPY         Social History:    Social History     Tobacco Use    Smoking status: Never Smoker    Smokeless tobacco: Never Used   Substance Use Topics    Alcohol use: No                                Counseling given: Not Answered      Vital Signs (Current):   Vitals:    07/08/20 1311 07/10/20 1118   BP:  126/72   Pulse:  88   Resp:  14   Temp:  97.9 °F (36.6 °C)   TempSrc:  Temporal   SpO2:  98%   Weight: 146 lb (66.2 kg)    Height: 5' 2\" (1.575 m)                                               BP Readings from Last 3 Encounters:   07/10/20 126/72   04/03/19 113/63   06/18/14 110/66       NPO Status: Time of last liquid consumption: 2100                        Time of last solid consumption: 2100                        Date of last liquid consumption: 07/09/20                        Date of last solid food consumption: 07/09/20    BMI:   Wt Readings from Last 3 Encounters:   07/08/20 146 lb (66.2 kg)   04/03/19 149 lb 11.1 oz (67.9 kg)   06/18/14 140 lb (63.5 kg)     Body mass index is 26.7 kg/m². CBC:   Lab Results   Component Value Date    WBC 9.0 04/03/2019    RBC 4.18 04/03/2019    HGB 12.8 04/03/2019    HCT 38.9 04/03/2019    MCV 93.1 04/03/2019    RDW 13.9 04/03/2019     04/03/2019       CMP:   Lab Results   Component Value Date     04/03/2019    K 3.7 04/03/2019     04/03/2019    CO2 26 04/03/2019    BUN 17 04/03/2019    CREATININE 0.7 04/03/2019    GFRAA >60 04/03/2019    AGRATIO 1.6 04/03/2019    LABGLOM >60 04/03/2019    GLUCOSE 103 04/03/2019    PROT 6.8 04/03/2019    CALCIUM 9.3 04/03/2019    BILITOT 0.5 04/03/2019    ALKPHOS 81 04/03/2019    AST 18 04/03/2019    ALT 16 04/03/2019       POC Tests: No results for input(s): POCGLU, POCNA, POCK, POCCL, POCBUN, POCHEMO, POCHCT in the last 72 hours.     Coags: No results found for: PROTIME, INR, APTT    HCG (If Applicable): No results found for: PREGTESTUR, PREGSERUM, HCG, HCGQUANT     ABGs: No results found for: PHART, PO2ART, FZB8KQA, UNQ3AVG, BEART, J3LJSALO     Type & Screen (If Applicable):  No results found for: LABABO, LABRH    Drug/Infectious Status (If Applicable):  No results found for: HIV, HEPCAB    COVID-19 Screening (If Applicable):   Lab Results   Component Value Date    COVID19 Not Detected 07/06/2020         Anesthesia Evaluation  Patient summary reviewed and Nursing notes reviewed no history of anesthetic complications:   Airway: Mallampati: II  TM distance: >3 FB   Neck ROM: full  Mouth opening: > = 3 FB Dental:    (+) upper dentures and lower dentures      Pulmonary:Negative Pulmonary ROS                              Cardiovascular:Negative CV ROS  Exercise tolerance: good (>4 METS),           Rhythm: regular                      Neuro/Psych:   Negative Neuro/Psych ROS              GI/Hepatic/Renal: Neg GI/Hepatic/Renal ROS            Endo/Other:    (+) hypothyroidism::., malignancy/cancer (h/o breast cancer). (-) diabetes mellitus, hyperthyroidism, blood dyscrasia, arthritis, no electrolyte abnormalities               Abdominal:           Vascular: negative vascular ROS. Anesthesia Plan      MAC     ASA 3       Induction: intravenous. MIPS: Prophylactic antiemetics administered. Anesthetic plan and risks discussed with patient. Plan discussed with CRNA. Elvie Shaffer MD   7/10/2020  This pre-anesthesia assessment may be used as a history and physical.    DOS STAFF ADDENDUM:    Pt seen and examined, chart reviewed (including anesthesia, drug and allergy history). No interval changes to history and physical examination. Anesthetic plan, risks, benefits, alternatives, and personnel involved discussed with patient. Patient verbalized an understanding and agrees to proceed.       Elvie Shaffer MD  July 10, 2020  11:45 AM

## 2020-07-10 NOTE — PROGRESS NOTES
Discharge instructions reviewed with pt and family. Verbalized understanding. Pt ready for discharge.

## 2020-07-11 NOTE — OP NOTE
[address] [place] [address] [place] [address]                                OPERATIVE REPORT    PATIENT NAME: Zhen Cheatham                      :        1934  MED REC NO:   [id number]                          ROOM:  ACCOUNT NO:   [de-identified]                           ADMIT DATE: 07/10/2020  PROVIDER:     Jim iPzano DPM      DATE OF PROCEDURE:  07/10/2020    PREOPERATIVE DIAGNOSES:  1. Hallux valgus deformity, right. 2.  Elongated second metatarsal, right. 3.  Hammer digit syndrome, second through fifth digits, right. POSTOPERATIVE DIAGNOSES:  1. Hallux valgus deformity, right. 2.  Elongated second metatarsal, right. 3.  Hammer digit syndrome, second through fifth digits, right. OPERATION PERFORMED:  1. Modified Oswaldo (scarf) bunionectomy, right foot. 2.  Distal metatarsal osteotomy, second, right. 3. PIPJ arthrodesis with K-wire fixation, second digit right. 4.  Flexor tenotomy third, fourth, and fifth digits, right. SURGEON:  Jim Pizano DPM    ANESTHESIA:  MAC. HEMOSTASIS:  Ankle tourniquet at 250 mmHg. ESTIMATED BLOOD LOSS:  Less than 5 mL. MATERIALS:  3-0 Vicryl, 4-0 Vicryl, 4-0 Monocryl, 4-0 nylon, three  [employer] 3.0 cannulated compression screws and one 0.045 K-wire. INJECTABLES:  Carbocaine 2%, 0.5% plain Marcaine, and Decadron. COMPLICATIONS:  None. INDICATIONS:  The patient presents to [facility] for surgical correction  of hallux valgus deformity, right; elongated second metatarsal, right;  and hammer digit syndrome, second through fifth digits, right foot. The  patient has had extensive conservative treatment without overall  improvement in symptoms and requests surgical intervention at this time. She was advised of alternative treatment options.   She was also advised  of possible risks and complications associated with the surgery  including infection, delayed healing, swelling, pain, numbness,  recurrence of deformity, flail toe, stiff toe, malunion, nonunion,  hallux varus, need for further surgical intervention or loss of digit or  leg due to vascular or infectious process. The patient understands  these possibilities and requests that surgery be performed as planned. She was given the opportunity to ask any questions and all questions  were answered in a satisfactory manner. OPERATIVE PROCEDURE:  The patient was taken to the operating room via  cart and placed on the table in supine position. She was given IV  sedation by the anesthesia staff. She was also given local anesthetic  block of the right foot consisting of 2% Carbocaine. A well-padded  tourniquet was placed at the level of the right ankle. The patient's  foot was prepped and draped in the usual aseptic technique. An Esmarch  bandage was used to exsanguinate the foot, and the tourniquet was  inflated to 250 mmHg, which provided adequate hemostasis throughout the  entire procedure. Attention was focused to the dorsomedial aspect of the first MPJ, right. The incision was deepened and underscored, taking care to retract all  neurovascular structures and electrocoagulating any bleeders as  necessary. Deep dissection was continued to the level of the first MPJ  capsule. The capsule was exposed both dorsally and medially and a  T-capsulotomy performed. Dissection was continued to expose the distal  first metatarsal.  Upon adequate exposure, there was noted to be  significantly hypertrophic bone at the medial aspect of the first  metatarsal head. This hypertrophic bone was excised in toto using a  sagittal saw. A modified Oswaldo (scarf type) osteotomy was then performed. The distal  first metatarsal was transposed laterally and fixated using two-screw  fixation. First screw was directed from dorsal proximal to plantar  distal and measured 22 mm in length.   The second screw was directed from  dorsal to plantar in a more proximal orientation and was directed from  dorsal to plantar. It measured 14 mm in length. There was also noted  to be a cartilaginous defect in the central medial portion of the first  metatarsal head measuring approximately 3 to 4 mm in diameter. Subchondral drilling was performed at this site. Lateral release of the  adductor tendon was performed. Lengthening of the extensor hallucis  longus tendon was also performed. Medial capsulorrhaphy was performed. The capsular structures were re-approximated using 3-0 Vicryl. The  subcutaneous tissues were re-approximated using 4-0 Vicryl. The skin  was re-approximated using a subcuticular suture of 4-0 Monocryl. Attention was then focused to the dorsal aspect of the foot overlying  the distal second metatarsal.  Linear incision measuring approximately 4  cm in length was made. The incision was deepened and underscored,  taking care to retract all neurovascular structures and  electrocoagulating any bleeders as necessary. Deep dissection was  continued to expose the distal second metatarsal.  Upon adequate  exposure, a Weil-type osteotomy was performed. The head of the  metatarsal was transposed approximately 2 mm proximal and fixated from  dorsal to plantar using a [employer] 3.0 x 12 mm cannulated  compression screw. The resultant dorsal eminence was excised in toto. The area was rasped smooth. The surgical site was copiously irrigated  with antibiotic solution. The site was carefully examined. There was  found to be excellent rigid fixation at the osteotomy site. The deep  tissue and capsular structures were re-approximated using 4-0 Vicryl. The subcutaneous tissues were re-approximated using 4-0 Vicryl. The  skin was re-approximated using a subcuticular suture of 4-0 Monocryl. Attention was then focused to the second digit, which was significantly  dorsally contracted.   A linear incision measuring approximately 2

## 2021-11-26 NOTE — PROGRESS NOTES
Place patient label inside box (if no patient label, complete below)  Name:  :  MR#:   Kymberly [place] / PROCEDURE  1. I (we), Talonia Corey (Patient Name) authorize Binh Herrera MD (Provider / Jasmyn Garcia) and/or such assistants as may be selected by him/her, to perform the following operation/procedure(s): RIGHT HALLUX METATARSOPHALANGEAL JOINT ARTHRODESIS, INTERPHALANGEAL JOINT ARTHRODESIS HALLUX, MULTIPLE METATARSAL OSTEOTOMIES, HAMMER TOE CORRECTION DIGITS 2, 3, 4, 5       Note: If unable to obtain consent prior to an emergent procedure, document the emergent reason in the medical record. This procedure has been explained to my (our) satisfaction and included in the explanation was:  A) The intended benefit, nature, and extent of the procedure to be performed;  B) The significant risks involved and the probability of success;  C) Alternative procedures and methods of treatment;  D) The dangers and probable consequences of such alternatives (including no procedure or treatment); E) The expected consequences of the procedure on my future health;  F) Whether other qualified individuals would be performing important surgical tasks and/or whether  would be present to advise or support the procedure. I (we) understand that there are other risks of infection and other serious complications in the pre-operative/procedural and postoperative/procedural stages of my (our) care. I (we) have asked all of the questions which I (we) thought were important in deciding whether or not to undergo treatment or diagnosis. These questions have been answered to my (our) satisfaction. I (we) understand that no assurance can be given that the procedure will be a success, and no guarantee or warranty of success has been given to me (us).     2. It has been explained to me (us) that during the course of the operation/procedure, unforeseen conditions may be revealed that necessitate extension of the original procedure(s) or different procedure(s) than those set forth in Paragraph 1. I (we) authorize and request that the above-named physician, his/her assistants or his/her designees, perform procedures as necessary and desirable if deemed to be in my (our) best interest.     Revised 8/2/2021                                                                          Page 1 of 2       3. I acknowledge that health care personnel may be observing this procedure for the purpose of medical education or other specified purposes as may be necessary as requested and/or approved by my (our) physician. 4. I (we) consent to the disposal by the hospital Pathologist of the removed tissue, parts or organs in accordance with hospital policy. 5. I do ____ do not ____ consent to the use of a local infiltration pain blocking agent that will be used by my provider/surgical provider to help alleviate pain during my procedure. 6. I do ____ do not ____ consent to an emergent blood transfusion in the case of a life-threatening situation that requires blood components to be administered. This consent is valid for 24 hours from the beginning of the procedure. 7. This patient does ____ or does not ____ currently have a DNR status/order. If DNR order is in place, obtain Addendum to the Surgical Consent for ALL Patients with a DNR Order to address heidy-operative status for limited intervention or DNR suspension.      8. I have read and fully understand the above Consent for Operation/Procedure and that all blanks were completed before I signed the consent.   _____________________________       _____________________      ____/____am/pm  Signature of Patient or legal representative      Printed Name / Relationship            Date / Time   ____________________________       _____________________      ____/____am/pm  Witness to Signature Printed Name                    Date / Time     If patient is unable to sign or is a minor, complete the following)  Patient is a minor, ____ years of age, or unable to sign because:   ______________________________________________________________________________________________    [place] [place] If a phone consent is obtained, consent will be documented by using two health care professionals, each affirming that the consenting party has no questions and gives consent for the procedure discussed with the physician/provider.   _____________________          ____________________       _____/_____am/pm   2nd witness to phone consent        Printed name           Date / Time    Informed Consent:  I have provided the explanation described above in section 1 to the patient and/or legal representative.  I have provided the patient and/or legal representative with an opportunity to ask any questions about the proposed operation/procedure.   ___________________________          ____________________         ____/____am/pm  Provider / Proceduralist                            Printed name            Date / Time  Revised 8/2/2021                                                                      Page 2 of 2

## 2021-11-26 NOTE — PROGRESS NOTES
11-26 H&P done 11/23 with Pippa Devlin [phone] however not completed(see CE) office was closed when I tried to call; patient did confirm she had this done then. -db    11-30 Spoke with Rafal Reynolds at Dr. Stephan Boyce, H&P is not complete in [facility]. Surgery is 12-2, please complte H&P.   /eg    12/1 6402 Shayy Florian, at PCP , will fax EKG. /kayy    12/1 1254 H&P was faxed , but no EKG tracing.  Ciara will refax EKg. Niesha Mode

## 2021-11-26 NOTE — PROGRESS NOTES
[facility] [facility] patients having surgery or anesthesia are required to be Covid tested OR to have been vaccinated at least 14 days prior to your procedure. It is very important to return our call to [phone] and notify the staff of your last vaccination date otherwise you will be required to complete Covid PCR test within the 5-6 days prior to surgery & quarantine. The results will need to be faxed to PreAdmission Testing at [phone]. PRIOR TO PROCEDURE DATE:        1. PLEASE FOLLOW ANY  GUIDELINES/ INSTRUCTIONS PRIOR TO YOUR PROCEDURE AS ADVISED BY YOUR SURGEON. 2. Arrange for someone to drive you home and be with you for the first 24 hours after discharge for your safety after your procedure for which you received sedation. Ensure it is someone we can share information with regarding your discharge. 3. You must contact your surgeon for instructions IF:   You are taking any blood thinners, aspirin, anti-inflammatory or vitamin E.   There is a change in your physical condition such as a cold, fever, rash, cuts, sores or any other infection, especially near your surgical site. 4. Do not drink alcohol the day before or day of your procedure. 5. A Pre-op History and Physical for surgery MUST be completed by your Physician or Urgent Care within 30 days of your procedure date. Please bring a copy with you on the day of your procedure and along with any other testing performed. THE DAY OF YOUR PROCEDURE:  1. Follow instructions for ARRIVAL TIME as DIRECTED BY YOUR SURGEON. 2. Enter the MAIN entrance from [employer] and follow the signs to the free [employer] or [employer] parking (offered free of charge 6am-5pm). 3. Enter the Main Entrance of the hospital (do not enter from the lower level of the parking garage). Upon entrance, check in with the  at the main desk on your left. If no one is available at the desk, proceed into the [facility] Waiting Room and go through the door directly into the [facility]. There is a Check-in desk ACROSS from Room 5 (marked with a sign hanging from the ceiling). The phone number for the surgery center is [phone]. 4. Please call [phone] option #2 option #2 if you have not been preregistered yet. On the day of your procedure bring your insurance card and photo ID. You will be registered at your bedside once brought back to your room. 5. DO NOT EAT ANYTHING eight hours prior to your arrival for surgery. May have 8 ounces of water 4 hours prior to your arrival for surgery. NOTE: ALL Gastric, Bariatric and Bowel surgery patients MUST follow their surgeon's instructions. 6. MEDICATIONS    Take the following medications with a SMALL sip of water: synthroid   Bariatric patient's call surgeon if on diabetic medications as some need to be stopped 1 week preop   Use your usual dose of inhalers the morning of surgery. BRING your rescue inhaler with you to hospital.    Anesthesia does NOT want you to take insulin the morning of surgery. They will control your blood sugar while you are at the hospital. Please contact your ordering physician for instructions regarding your insulin the night before your procedure. If you have an insulin pump, please keep it set on basal rate. 7. Do not swallow water when brushing teeth. No gum, candy, mints or ice chips. Refrain from smoking or at least decrease the amount. 8. Dress in loose, comfortable clothing appropriate for redressing after your procedure. Do not wear jewelry (including body piercings), make-up (especially NO eye make-up), fingernail polish (NO toenail polish if foot/leg surgery), lotion, powders or metal hairclips. 9. Dentures, glasses, or contacts will need to be removed before your procedure.  Bring cases for your glasses, contacts, dentures, or hearing aids to protect them while you are in surgery. 10. If you use a CPAP, please bring it with you on the day of your procedure. 11. We recommend that valuable personal  belongings such as cash, cell phones, e-tablets or jewelry, be left at home during your stay. The hospital will not be responsible for valuables that are not secured in the hospital safe. However, if your insurance requires a co-pay, you may want to bring a method of payment, i.e. Check or credit card, if you wish to pay your co-pay the day of surgery. 12. If you are to stay overnight, you may bring a bag with personal items. Please have any large items you may need brought in by your family after your arrival to your hospital room. 15. If you have a Living Will or Durable Power of , please bring a copy on the day of your procedure. 15. With your permission, one family member may accompany you while you are being prepared for surgery. Once you are ready, additional family members may join you. HOW WE KEEP YOU SAFE and WORK TO PREVENT SURGICAL SITE INFECTIONS:  1. Health care workers should always check your ID bracelet to verify your name and birth date. You will be asked many times to state your name, date of birth, and allergies. 2. Health care workers should always clean their hands with soap or alcohol gel before providing care to you. It is okay to ask anyone if they cleaned their hands before they touch you. 3. You will be actively involved in verifying the type of procedure you are having and ensuring the correct surgical site. This will be confirmed multiple times prior to your procedure. Do NOT alex your surgery site UNLESS instructed to by your surgeon. 4. Do not shave or wax for 72 hours prior to procedure near your operative site. Shaving with a razor can irritate your skin and make it easier to develop an infection.  On the day of your procedure, any hair that needs to be removed near the surgical site will be clipped by a healthcare worker using a special clippers designed to avoid skin irritation. 5. When you are in the operating room, your surgical site will be cleansed with a special soap, and in most cases, you will be given an antibiotic before the surgery begins. What to expect AFTER YOUR PROCEDURE:  1. Immediately following your procedure, your will be taken to the PACU for the first phase of your recovery. Your nurse will help you recover from any potential side effects of anesthesia, such as extreme drowsiness, changes in your vital signs or breathing patterns. Nausea, headache, muscle aches, or sore throat may also occur after anesthesia. Your nurse will help you manage these potential side effects. 2. For comfort and safety, arrange to have someone at home with you for the first 24 hours after discharge. 3. You and your family will be given written instructions about your diet, activity, dressing care, medications, and return visits. 4. Once at home, should issues with nausea, pain, or bleeding occur, or should you notice any signs of infection, you should call your surgeon. 5. Always clean your hands before and after caring for your wound. Do not let your family touch your surgery site without cleaning their hands. 6. Narcotic pain medications can cause significant constipation. You may want to add a stool softener to your postoperative medication schedule or speak to your surgeon on how best to manage this SIDE EFFECT. SPECIAL INSTRUCTIONS patient acknowledges understanding of pre op instructions     Thank you for allowing us to care for you. We strive to exceed your expectations in the delivery of care and service provided to you and your family. If you need to contact the [facility] [place] [address] staff for any reason, please call us at [phone]    Instructions reviewed with patient during preadmission testing phone interview.   [address], RN.11/26/2021 .8:35 AM      ADDITIONAL EDUCATIONAL INFORMATION REVIEWED PER PHONE WITH YOU AND/OR YOUR FAMILY:  Yes Hibiclens® Bathing Instructions   No Antibacterial Soap

## 2021-12-01 ENCOUNTER — ANESTHESIA EVENT (OUTPATIENT)
Dept: OPERATING ROOM | Age: 86
End: 2021-12-01
Payer: MEDICARE

## 2021-12-02 ENCOUNTER — ANESTHESIA (OUTPATIENT)
Dept: OPERATING ROOM | Age: 86
End: 2021-12-02
Payer: MEDICARE

## 2021-12-02 ENCOUNTER — HOSPITAL ENCOUNTER (OUTPATIENT)
Age: 86
Setting detail: OUTPATIENT SURGERY
Discharge: HOME | End: 2021-12-02
Attending: ORTHOPAEDIC SURGERY | Admitting: ORTHOPAEDIC SURGERY
Payer: MEDICARE

## 2021-12-02 VITALS
TEMPERATURE: 97 F | HEART RATE: 78 BPM | BODY MASS INDEX: 25.76 KG/M2 | RESPIRATION RATE: 14 BRPM | OXYGEN SATURATION: 97 % | SYSTOLIC BLOOD PRESSURE: 126 MMHG | WEIGHT: 140 LBS | DIASTOLIC BLOOD PRESSURE: 73 MMHG | HEIGHT: 62 IN

## 2021-12-02 VITALS — OXYGEN SATURATION: 99 % | SYSTOLIC BLOOD PRESSURE: 103 MMHG | DIASTOLIC BLOOD PRESSURE: 58 MMHG

## 2021-12-02 LAB
ANION GAP SERPL CALCULATED.3IONS-SCNC: 10 MMOL/L (ref 3–16)
BUN BLDV-MCNC: 13 MG/DL (ref 7–20)
CALCIUM SERPL-MCNC: 8.9 MG/DL (ref 8.3–10.6)
CHLORIDE BLD-SCNC: 103 MMOL/L (ref 99–110)
CO2: 27 MMOL/L (ref 21–32)
CREAT SERPL-MCNC: 0.8 MG/DL (ref 0.6–1.2)
EKG ATRIAL RATE: 78 BPM
EKG DIAGNOSIS: NORMAL
EKG P AXIS: 68 DEGREES
EKG P-R INTERVAL: 146 MS
EKG Q-T INTERVAL: 416 MS
EKG QRS DURATION: 82 MS
EKG QTC CALCULATION (BAZETT): 474 MS
EKG R AXIS: -70 DEGREES
EKG T AXIS: 48 DEGREES
EKG VENTRICULAR RATE: 78 BPM
GFR AFRICAN AMERICAN: >60
GFR NON-AFRICAN AMERICAN: >60
GLUCOSE BLD-MCNC: 84 MG/DL (ref 70–99)
GLUCOSE BLD-MCNC: 85 MG/DL (ref 70–99)
HCT VFR BLD CALC: 40 % (ref 36–48)
HEMOGLOBIN: 13.2 G/DL (ref 12–16)
MCH RBC QN AUTO: 31.1 PG (ref 26–34)
MCHC RBC AUTO-ENTMCNC: 33 G/DL (ref 31–36)
MCV RBC AUTO: 94.2 FL (ref 80–100)
PDW BLD-RTO: 13.9 % (ref 12.4–15.4)
PERFORMED ON: NORMAL
PLATELET # BLD: 214 K/UL (ref 135–450)
PMV BLD AUTO: 7.5 FL (ref 5–10.5)
POTASSIUM SERPL-SCNC: 3.9 MMOL/L (ref 3.5–5.1)
RBC # BLD: 4.24 M/UL (ref 4–5.2)
SODIUM BLD-SCNC: 140 MMOL/L (ref 136–145)
WBC # BLD: 8.6 K/UL (ref 4–11)

## 2021-12-02 PROCEDURE — 3700000000 HC ANESTHESIA ATTENDED CARE: Performed by: ORTHOPAEDIC SURGERY

## 2021-12-02 PROCEDURE — 2580000003 HC RX 258: Performed by: ORTHOPAEDIC SURGERY

## 2021-12-02 PROCEDURE — 2720000010 HC SURG SUPPLY STERILE: Performed by: ORTHOPAEDIC SURGERY

## 2021-12-02 PROCEDURE — 85027 COMPLETE CBC AUTOMATED: CPT

## 2021-12-02 PROCEDURE — C1713 ANCHOR/SCREW BN/BN,TIS/BN: HCPCS | Performed by: ORTHOPAEDIC SURGERY

## 2021-12-02 PROCEDURE — C1769 GUIDE WIRE: HCPCS | Performed by: ORTHOPAEDIC SURGERY

## 2021-12-02 PROCEDURE — 2580000003 HC RX 258: Performed by: ANESTHESIOLOGY

## 2021-12-02 PROCEDURE — 2500000003 HC RX 250 WO HCPCS: Performed by: ORTHOPAEDIC SURGERY

## 2021-12-02 PROCEDURE — 7100000011 HC PHASE II RECOVERY - ADDTL 15 MIN: Performed by: ORTHOPAEDIC SURGERY

## 2021-12-02 PROCEDURE — 7100000010 HC PHASE II RECOVERY - FIRST 15 MIN: Performed by: ORTHOPAEDIC SURGERY

## 2021-12-02 PROCEDURE — 7100000001 HC PACU RECOVERY - ADDTL 15 MIN: Performed by: ORTHOPAEDIC SURGERY

## 2021-12-02 PROCEDURE — 6360000002 HC RX W HCPCS: Performed by: NURSE ANESTHETIST, CERTIFIED REGISTERED

## 2021-12-02 PROCEDURE — 6360000002 HC RX W HCPCS: Performed by: ORTHOPAEDIC SURGERY

## 2021-12-02 PROCEDURE — 3700000001 HC ADD 15 MINUTES (ANESTHESIA): Performed by: ORTHOPAEDIC SURGERY

## 2021-12-02 PROCEDURE — 80048 BASIC METABOLIC PNL TOTAL CA: CPT

## 2021-12-02 PROCEDURE — 3600000004 HC SURGERY LEVEL 4 BASE: Performed by: ORTHOPAEDIC SURGERY

## 2021-12-02 PROCEDURE — 3600000014 HC SURGERY LEVEL 4 ADDTL 15MIN: Performed by: ORTHOPAEDIC SURGERY

## 2021-12-02 PROCEDURE — 87641 MR-STAPH DNA AMP PROBE: CPT

## 2021-12-02 PROCEDURE — 2580000003 HC RX 258: Performed by: NURSE ANESTHETIST, CERTIFIED REGISTERED

## 2021-12-02 PROCEDURE — 2709999900 HC NON-CHARGEABLE SUPPLY: Performed by: ORTHOPAEDIC SURGERY

## 2021-12-02 PROCEDURE — 7100000000 HC PACU RECOVERY - FIRST 15 MIN: Performed by: ORTHOPAEDIC SURGERY

## 2021-12-02 DEVICE — IMPLANTABLE DEVICE: Type: IMPLANTABLE DEVICE | Site: FOOT | Status: FUNCTIONAL

## 2021-12-02 DEVICE — SCREW BNE L10MM DIA1.5MM CORT TI ST FULL THRD COMPR T4: Type: IMPLANTABLE DEVICE | Site: FOOT | Status: FUNCTIONAL

## 2021-12-02 DEVICE — SCREW BNE L12MM DIA1.5MM CORT TI ST FULL THRD COMPR T4: Type: IMPLANTABLE DEVICE | Site: FOOT | Status: FUNCTIONAL

## 2021-12-02 DEVICE — SCREW BONE CANN SELF-COMPRESSIVE 2.6MMX24MM: Type: IMPLANTABLE DEVICE | Site: FOOT | Status: FUNCTIONAL

## 2021-12-02 DEVICE — SCREW BNE L11MM DIA1.5MM CORT TI ST FULL THRD COMPR T4: Type: IMPLANTABLE DEVICE | Site: FOOT | Status: FUNCTIONAL

## 2021-12-02 RX ORDER — PROPOFOL 10 MG/ML
INJECTION, EMULSION INTRAVENOUS PRN
Status: DISCONTINUED | OUTPATIENT
Start: 2021-12-02 | End: 2021-12-02 | Stop reason: SDUPTHER

## 2021-12-02 RX ORDER — ASPIRIN 325 MG
325 TABLET, DELAYED RELEASE (ENTERIC COATED) ORAL
Qty: 30 TABLET | Refills: 3 | COMMUNITY
Start: 2021-12-02 | End: 2022-02-01

## 2021-12-02 RX ORDER — SODIUM CHLORIDE, SODIUM LACTATE, POTASSIUM CHLORIDE, CALCIUM CHLORIDE 600; 310; 30; 20 MG/100ML; MG/100ML; MG/100ML; MG/100ML
INJECTION, SOLUTION INTRAVENOUS CONTINUOUS PRN
Status: DISCONTINUED | OUTPATIENT
Start: 2021-12-02 | End: 2021-12-02 | Stop reason: SDUPTHER

## 2021-12-02 RX ORDER — DIPHENHYDRAMINE HYDROCHLORIDE 50 MG/ML
12.5 INJECTION INTRAMUSCULAR; INTRAVENOUS
Status: DISCONTINUED | OUTPATIENT
Start: 2021-12-02 | End: 2021-12-02 | Stop reason: HOSPADM

## 2021-12-02 RX ORDER — ONDANSETRON 2 MG/ML
4 INJECTION INTRAMUSCULAR; INTRAVENOUS
Status: DISCONTINUED | OUTPATIENT
Start: 2021-12-02 | End: 2021-12-02 | Stop reason: HOSPADM

## 2021-12-02 RX ORDER — BUPIVACAINE HYDROCHLORIDE 5 MG/ML
INJECTION, SOLUTION EPIDURAL; INTRACAUDAL PRN
Status: DISCONTINUED | OUTPATIENT
Start: 2021-12-02 | End: 2021-12-02 | Stop reason: ALTCHOICE

## 2021-12-02 RX ORDER — HYDROCODONE BITARTRATE AND ACETAMINOPHEN 5; 325 MG/1; MG/1
1 TABLET ORAL
Status: DISCONTINUED | OUTPATIENT
Start: 2021-12-02 | End: 2021-12-02 | Stop reason: HOSPADM

## 2021-12-02 RX ORDER — SODIUM CHLORIDE, SODIUM LACTATE, POTASSIUM CHLORIDE, CALCIUM CHLORIDE 600; 310; 30; 20 MG/100ML; MG/100ML; MG/100ML; MG/100ML
INJECTION, SOLUTION INTRAVENOUS CONTINUOUS
Status: DISCONTINUED | OUTPATIENT
Start: 2021-12-02 | End: 2021-12-02 | Stop reason: HOSPADM

## 2021-12-02 RX ORDER — FENTANYL CITRATE 50 UG/ML
INJECTION, SOLUTION INTRAMUSCULAR; INTRAVENOUS PRN
Status: DISCONTINUED | OUTPATIENT
Start: 2021-12-02 | End: 2021-12-02 | Stop reason: SDUPTHER

## 2021-12-02 RX ORDER — DEXAMETHASONE SODIUM PHOSPHATE 10 MG/ML
INJECTION, SOLUTION INTRAMUSCULAR; INTRAVENOUS
Status: DISCONTINUED
Start: 2021-12-02 | End: 2021-12-02 | Stop reason: HOSPADM

## 2021-12-02 RX ORDER — PROCHLORPERAZINE EDISYLATE 5 MG/ML
5 INJECTION INTRAMUSCULAR; INTRAVENOUS
Status: DISCONTINUED | OUTPATIENT
Start: 2021-12-02 | End: 2021-12-02 | Stop reason: HOSPADM

## 2021-12-02 RX ORDER — LIDOCAINE HYDROCHLORIDE 10 MG/ML
INJECTION, SOLUTION EPIDURAL; INFILTRATION; INTRACAUDAL; PERINEURAL PRN
Status: DISCONTINUED | OUTPATIENT
Start: 2021-12-02 | End: 2021-12-02 | Stop reason: ALTCHOICE

## 2021-12-02 RX ORDER — FENTANYL CITRATE 50 UG/ML
INJECTION, SOLUTION INTRAMUSCULAR; INTRAVENOUS
Status: COMPLETED
Start: 2021-12-02 | End: 2021-12-02

## 2021-12-02 RX ORDER — PROPOFOL 10 MG/ML
INJECTION, EMULSION INTRAVENOUS CONTINUOUS PRN
Status: DISCONTINUED | OUTPATIENT
Start: 2021-12-02 | End: 2021-12-02 | Stop reason: SDUPTHER

## 2021-12-02 RX ORDER — MIDAZOLAM HYDROCHLORIDE 1 MG/ML
INJECTION INTRAMUSCULAR; INTRAVENOUS PRN
Status: DISCONTINUED | OUTPATIENT
Start: 2021-12-02 | End: 2021-12-02 | Stop reason: SDUPTHER

## 2021-12-02 RX ORDER — MEPERIDINE HYDROCHLORIDE 25 MG/ML
12.5 INJECTION INTRAMUSCULAR; INTRAVENOUS; SUBCUTANEOUS EVERY 5 MIN PRN
Status: DISCONTINUED | OUTPATIENT
Start: 2021-12-02 | End: 2021-12-02 | Stop reason: HOSPADM

## 2021-12-02 RX ORDER — ROPIVACAINE HYDROCHLORIDE 5 MG/ML
INJECTION, SOLUTION EPIDURAL; INFILTRATION; PERINEURAL
Status: DISCONTINUED
Start: 2021-12-02 | End: 2021-12-02 | Stop reason: HOSPADM

## 2021-12-02 RX ORDER — HYDRALAZINE HYDROCHLORIDE 20 MG/ML
5 INJECTION INTRAMUSCULAR; INTRAVENOUS EVERY 10 MIN PRN
Status: DISCONTINUED | OUTPATIENT
Start: 2021-12-02 | End: 2021-12-02 | Stop reason: HOSPADM

## 2021-12-02 RX ORDER — 0.9 % SODIUM CHLORIDE 0.9 %
500 INTRAVENOUS SOLUTION INTRAVENOUS
Status: DISCONTINUED | OUTPATIENT
Start: 2021-12-02 | End: 2021-12-02 | Stop reason: HOSPADM

## 2021-12-02 RX ORDER — MIDAZOLAM HYDROCHLORIDE 1 MG/ML
INJECTION INTRAMUSCULAR; INTRAVENOUS
Status: COMPLETED
Start: 2021-12-02 | End: 2021-12-02

## 2021-12-02 RX ADMIN — SODIUM CHLORIDE, SODIUM LACTATE, POTASSIUM CHLORIDE, AND CALCIUM CHLORIDE: .6; .31; .03; .02 INJECTION, SOLUTION INTRAVENOUS at 11:37

## 2021-12-02 RX ADMIN — FENTANYL CITRATE 50 MCG: 50 INJECTION, SOLUTION INTRAMUSCULAR; INTRAVENOUS at 09:31

## 2021-12-02 RX ADMIN — FENTANYL CITRATE 50 MCG: 50 INJECTION, SOLUTION INTRAMUSCULAR; INTRAVENOUS at 11:08

## 2021-12-02 RX ADMIN — PROPOFOL 20 MG: 10 INJECTION, EMULSION INTRAVENOUS at 10:30

## 2021-12-02 RX ADMIN — MIDAZOLAM HYDROCHLORIDE 1 MG: 2 INJECTION, SOLUTION INTRAMUSCULAR; INTRAVENOUS at 09:41

## 2021-12-02 RX ADMIN — SODIUM CHLORIDE, SODIUM LACTATE, POTASSIUM CHLORIDE, AND CALCIUM CHLORIDE: .6; .31; .03; .02 INJECTION, SOLUTION INTRAVENOUS at 09:19

## 2021-12-02 RX ADMIN — PROPOFOL 60 MCG/KG/MIN: 10 INJECTION, EMULSION INTRAVENOUS at 11:48

## 2021-12-02 RX ADMIN — FENTANYL CITRATE 50 MCG: 50 INJECTION, SOLUTION INTRAMUSCULAR; INTRAVENOUS at 10:54

## 2021-12-02 RX ADMIN — PROPOFOL 20 MG: 10 INJECTION, EMULSION INTRAVENOUS at 09:34

## 2021-12-02 RX ADMIN — FENTANYL CITRATE 50 MCG: 50 INJECTION, SOLUTION INTRAMUSCULAR; INTRAVENOUS at 09:41

## 2021-12-02 RX ADMIN — FENTANYL CITRATE 50 MCG: 50 INJECTION, SOLUTION INTRAMUSCULAR; INTRAVENOUS at 10:17

## 2021-12-02 RX ADMIN — MIDAZOLAM HYDROCHLORIDE 1 MG: 2 INJECTION, SOLUTION INTRAMUSCULAR; INTRAVENOUS at 09:31

## 2021-12-02 RX ADMIN — FENTANYL CITRATE 50 MCG: 50 INJECTION, SOLUTION INTRAMUSCULAR; INTRAVENOUS at 10:30

## 2021-12-02 RX ADMIN — SODIUM CHLORIDE, POTASSIUM CHLORIDE, SODIUM LACTATE AND CALCIUM CHLORIDE: 600; 310; 30; 20 INJECTION, SOLUTION INTRAVENOUS at 09:19

## 2021-12-02 RX ADMIN — PROPOFOL 50 MCG/KG/MIN: 10 INJECTION, EMULSION INTRAVENOUS at 09:32

## 2021-12-02 RX ADMIN — PROPOFOL 20 MG: 10 INJECTION, EMULSION INTRAVENOUS at 09:48

## 2021-12-02 RX ADMIN — PROPOFOL 70 MCG/KG/MIN: 10 INJECTION, EMULSION INTRAVENOUS at 10:27

## 2021-12-02 ASSESSMENT — PULMONARY FUNCTION TESTS
PIF_VALUE: 1
PIF_VALUE: 3
PIF_VALUE: 1

## 2021-12-02 ASSESSMENT — LIFESTYLE VARIABLES: SMOKING_STATUS: 0

## 2021-12-02 ASSESSMENT — PAIN SCALES - GENERAL
PAINLEVEL_OUTOF10: 0

## 2021-12-02 ASSESSMENT — PAIN DESCRIPTION - DESCRIPTORS: DESCRIPTORS: ACHING;BURNING

## 2021-12-02 ASSESSMENT — PAIN - FUNCTIONAL ASSESSMENT: PAIN_FUNCTIONAL_ASSESSMENT: 0-10

## 2021-12-02 NOTE — ANESTHESIA POSTPROCEDURE EVALUATION
Department of Anesthesiology  Postprocedure Note    Patient: Patsy Olea  MRN: [id number]  YOB: 1934  Date of evaluation: 12/2/2021  Time:  3:52 PM     Procedure Summary     Date: 12/02/21 Room / Location: [facility] [address]    Anesthesia Start: 2432 Anesthesia Stop: 1873    Procedures:       RIGHT HALLUX METATARSOPHALANGEAL JOINT ARTHRODESIS, INTERPHALANGEAL JOINT ARTHRODESIS HALLUX, MULTIPLE METATARSAL OSTEOTOMIES, HAMMER TOE CORRECTION DIGITS 2,4, (Right Foot)      . (Right Foot) Diagnosis:       Hallux varus, right      Metatarsalgia of right foot      Hammer toe of right foot      (Hallux varus, right [M20.31] Metatarsalgia of right foot [M77.41] Hammer toe of right foot [M20.41])    Surgeons: Nakul Sen MD Responsible Provider: Kylah Stevens DO    Anesthesia Type: MAC, regional ASA Status: 2          Anesthesia Type: MAC, regional    Penny Phase I: Penny Score: 10    Penny Phase II: Penny Score: 10    Last vitals: Reviewed and per EMR flowsheets.        Anesthesia Post Evaluation    Patient location during evaluation: PACU  Patient participation: complete - patient participated  Level of consciousness: awake and alert  Pain score: 0  Airway patency: patent  Nausea & Vomiting: no nausea and no vomiting  Complications: no  Cardiovascular status: hemodynamically stable  Respiratory status: acceptable  Hydration status: stable

## 2021-12-02 NOTE — H&P
Inderkrzysztof Acevedo    [id number]    [facility] Same Day Surgery Update H & P  Department of General Surgery   Surgical Service   Pre-operative History and Physical  Last H & P within the last 30 days. DIAGNOSIS:   Hallux varus, right [M20.31]  Metatarsalgia of right foot [M77.41]  Hammer toe of right foot [M20.41]    Procedure(s):  RIGHT HALLUX METATARSOPHALANGEAL JOINT ARTHRODESIS, INTERPHALANGEAL JOINT ARTHRODESIS HALLUX, MULTIPLE METATARSAL OSTEOTOMIES, HAMMER TOE CORRECTION DIGITS 2,3,4,5  . History obtained from: Patient interview and EHR     HISTORY OF PRESENT ILLNESS:   Patient with right foot pain and hammertoe deformity of the 2nd, 3rd, 4th and 5th. The symptoms have been recalcitrant to conservative treatment and the patient presents today for the above procedure. Covid 19:  Patient denies fever, chills, worsening cough, or known exposure to Covid-19. Past Medical History:        Diagnosis Date    Atrial fibrillation ([address]     Breast cancer ([address]     right    Cancer ([address]     Deaf, right     History of gallstones     Thyroid disease      Past Surgical History:        Procedure Laterality Date    BREAST LUMPECTOMY Right     BUNIONECTOMY Right 7/10/2020    MODIFIED NILDA SCARF BUNIONECTOMY RIGHT FOOT, DISTAL METATARSAL OSTEOTOMY SECOND RIGHT, FLEXOR TENOTOMY THIRD AND FOURTH RIGHT, PROXIMAL INTERPROLONGED JOINT ARTHRODISIS RIGHT FOOT performed by Navneet Aldana DPM at [place] [address] THYROID SURGERY       Past Social History:  Social History     Socioeconomic History    Marital status:       Spouse name: None    Number of children: None    Years of education: None    Highest education level: None   Occupational History    None   Tobacco Use    Smoking status: Never Smoker    Smokeless tobacco: Never Used   Vaping Use    Vaping Use: Never used   Substance and Sexual Activity    Alcohol use: No    Drug use: Never    Sexual activity: Not Currently   Other Topics Concern    None   Social History Narrative    None     Social Determinants of Health     Financial Resource Strain:     Difficulty of Paying Living Expenses: Not on file   Food Insecurity:     Worried About Running Out of Food in the Last Year: Not on file    Johny of Food in the Last Year: Not on file   Transportation Needs:     Lack of Transportation (Medical): Not on file    Lack of Transportation (Non-Medical): Not on file   Physical Activity:     Days of Exercise per Week: Not on file    Minutes of Exercise per Session: Not on file   Stress:     Feeling of Stress : Not on file   Social Connections:     Frequency of Communication with Friends and Family: Not on file    Frequency of Social Gatherings with Friends and Family: Not on file    Attends Church Services: Not on file    Active Member of [address] [place] [address] or Organizations: Not on file    Attends Club or Organization Meetings: Not on file    Marital Status: Not on file   Intimate Partner Violence:     Fear of Current or Ex-Partner: Not on file    Emotionally Abused: Not on file    Physically Abused: Not on file    Sexually Abused: Not on file   Housing Stability:     Unable to Pay for Housing in the Last Year: Not on file    Number of [place] in the Last Year: Not on file    Unstable Housing in the Last Year: Not on file         Medications Prior to Admission:      Prior to Admission medications    Medication Sig Start Date End Date Taking? Authorizing Provider   levothyroxine (SYNTHROID) 25 MCG tablet Take 25 mcg by mouth daily    Yes Historical Provider, MD   raloxifene (EVISTA) 60 MG tablet Take 60 mg by mouth daily. Yes Historical Provider, MD   aspirin 81 MG EC tablet Take 81 mg by mouth daily. Historical Provider, MD         Allergies:  Patient has no known allergies.     PHYSICAL EXAM:      /77   Pulse 82   Temp 97.4 °F (36.3 °C) (Temporal)   Resp 16   Ht 5' 2\" (1.575 m)

## 2021-12-02 NOTE — PROGRESS NOTES
PACU Transfer to [facility]    Vitals:    12/02/21 1327   BP: 128/66   Pulse: 67   Resp: 12   Temp: 97 °F (36.1 °C)   SpO2: 99%         Intake/Output Summary (Last 24 hours) at 12/2/2021 1332  Last data filed at 12/2/2021 1331  Gross per 24 hour   Intake 1200 ml   Output --   Net 1200 ml       Pain assessment:    Pain Level: 0    Patient transferred to care of [facility] RN.    12/2/2021 1:32 PM

## 2021-12-02 NOTE — ANESTHESIA PRE PROCEDURE
Department of Anesthesiology  Preprocedure Note       Name:  Vania Covert   Age:  80 y.o.  :  1934                                          MRN:  [id number]         Date:  2021      Surgeon: Cm López):  Qi Oreilly MD    Procedure: Procedure(s):  RIGHT HALLUX METATARSOPHALANGEAL JOINT ARTHRODESIS, INTERPHALANGEAL JOINT ARTHRODESIS HALLUX, MULTIPLE METATARSAL OSTEOTOMIES, HAMMER TOE CORRECTION DIGITS 2,3,4,5  . Medications prior to admission:   Prior to Admission medications    Medication Sig Start Date End Date Taking? Authorizing Provider   levothyroxine (SYNTHROID) 25 MCG tablet Take 25 mcg by mouth daily    Yes Historical Provider, MD   raloxifene (EVISTA) 60 MG tablet Take 60 mg by mouth daily. Yes Historical Provider, MD   aspirin 81 MG EC tablet Take 81 mg by mouth daily.       Historical Provider, MD       Current medications:    Current Facility-Administered Medications   Medication Dose Route Frequency Provider Last Rate Last Admin    lactated ringers infusion   IntraVENous Continuous Qi Oreilly MD        ceFAZolin (ANCEF) 2000 mg in dextrose 5 % 50 mL IVPB  2,000 mg IntraVENous Once Qi Oreilly MD        lactated ringers infusion   IntraVENous Continuous Mohini Carrera MD           Allergies:  No Known Allergies    Problem List:    Patient Active Problem List   Diagnosis Code    Cancer of breast, female [facility]) C50.919    Chest pain R07.9       Past Medical History:        Diagnosis Date    Atrial fibrillation ([address]     Breast cancer ([address]     right    Cancer ([address]     Deaf, right     History of gallstones     Thyroid disease        Past Surgical History:        Procedure Laterality Date    BREAST LUMPECTOMY Right     BUNIONECTOMY Right 7/10/2020    MODIFIED NILDA SCARF BUNIONECTOMY RIGHT FOOT, DISTAL METATARSAL OSTEOTOMY SECOND RIGHT, FLEXOR TENOTOMY THIRD AND FOURTH RIGHT, PROXIMAL INTERPROLONGED JOINT ARTHRODISIS RIGHT FOOT performed by Lien Barragan DPM at [place] [address] THYROID SURGERY         Social History:    Social History     Tobacco Use    Smoking status: Never Smoker    Smokeless tobacco: Never Used   Substance Use Topics    Alcohol use: No                                Counseling given: Not Answered      Vital Signs (Current):   Vitals:    11/26/21 0811 12/02/21 0755   BP:  132/77   Pulse:  82   Resp:  16   Temp:  97.4 °F (36.3 °C)   TempSrc:  Temporal   SpO2:  99%   Weight: 140 lb (63.5 kg) 140 lb (63.5 kg)   Height: 5' 2\" (1.575 m) 5' 2\" (1.575 m)                                              BP Readings from Last 3 Encounters:   12/02/21 132/77   07/10/20 125/62   07/10/20 124/87       NPO Status: Time of last liquid consumption: 2200                        Time of last solid consumption: 1900                        Date of last liquid consumption: 12/01/21                        Date of last solid food consumption: 12/01/21    BMI:   Wt Readings from Last 3 Encounters:   12/02/21 140 lb (63.5 kg)   07/08/20 146 lb (66.2 kg)   04/03/19 149 lb 11.1 oz (67.9 kg)     Body mass index is 25.61 kg/m².     CBC:   Lab Results   Component Value Date    WBC 9.0 04/03/2019    RBC 4.18 04/03/2019    HGB 12.8 04/03/2019    HCT 38.9 04/03/2019    MCV 93.1 04/03/2019    RDW 13.9 04/03/2019     04/03/2019       CMP:   Lab Results   Component Value Date     04/03/2019    K 3.7 04/03/2019     04/03/2019    CO2 26 04/03/2019    BUN 17 04/03/2019    CREATININE 0.7 04/03/2019    GFRAA >60 04/03/2019    AGRATIO 1.6 04/03/2019    LABGLOM >60 04/03/2019    GLUCOSE 103 04/03/2019    PROT 6.8 04/03/2019    CALCIUM 9.3 04/03/2019    BILITOT 0.5 04/03/2019    ALKPHOS 81 04/03/2019    AST 18 04/03/2019    ALT 16 04/03/2019       POC Tests:   Recent Labs     12/02/21  0858   POCGLU 84       Coags: No results found for: PROTIME, INR, APTT    HCG (If Applicable): No results found for: PREGTESTUR, PREGSERUM, HCG, HCGQUANT     ABGs: No results found for: PHART, PO2ART, SIO7NTV, ZCE2QYA, BEART, B2GSWBXD     Type & Screen (If Applicable):  No results found for: LABABO, LABRH    Drug/Infectious Status (If Applicable):  No results found for: HIV, HEPCAB    COVID-19 Screening (If Applicable):   Lab Results   Component Value Date    COVID19 Not Detected 07/06/2020           Anesthesia Evaluation  Patient summary reviewed and Nursing notes reviewed no history of anesthetic complications:   Airway: Mallampati: II  TM distance: >3 FB   Neck ROM: full  Mouth opening: > = 3 FB Dental: normal exam         Pulmonary: breath sounds clear to auscultation      (-) not a current smoker (never)                           Cardiovascular:  Exercise tolerance: good (>4 METS),   (+) dysrhythmias: atrial fibrillation,       NYHA Classification: I    Rhythm: regular  Rate: normal           Beta Blocker:  Not on Beta Blocker         Neuro/Psych:   Negative Neuro/Psych ROS              GI/Hepatic/Renal:        (-) GERD       Endo/Other: Negative Endo/Other ROS   (+) malignancy/cancer (ca right breast ). Abdominal:             Vascular: negative vascular ROS. Other Findings:             Anesthesia Plan      MAC and regional     ASA 2     (Right ankle block )  Induction: intravenous. MIPS: Prophylactic antiemetics administered. Anesthetic plan and risks discussed with patient. Plan discussed with CRNA.     Attending anesthesiologist reviewed and agrees with Preprocedure content              Ignacio Tamayo DO   12/2/2021

## 2021-12-02 NOTE — PROGRESS NOTES
Pt awakened from sleep, denies nausea or pain, dressing CDI, surgical shoe sent to [place] to remind pt not to put weight on right foot, apple juice offered

## 2021-12-02 NOTE — PROGRESS NOTES
Patient to pacu 5 s/p RIGHT HALLUX METATARSOPHALANGEAL JOINT ARTHRODESIS, INTERPHALANGEAL JOINT ARTHRODESIS HALLUX, MULTIPLE METATARSAL OSTEOTOMIES, HAMMER TOE CORRECTION DIGITS 2,4, - Right , report received from CRNA, reported intra op block and hemodynamically stable.  All vitals stable upon arrival. Small amount of Breakthrough drainage noted, dressing reinforced with gauze and ace

## 2021-12-02 NOTE — BRIEF OP NOTE
Brief Postoperative Note      Patient: Gayathri Dumont  YOB: 1934  MRN: [id number]    Date of Procedure: 12/2/2021    Pre-Op Diagnosis: Hallux varus, right [M20.31] Metatarsalgia of right foot [M77.41] Hammer toe of right foot [M20.41]    Post-Op Diagnosis: Same       Procedure(s):  RIGHT HALLUX METATARSOPHALANGEAL JOINT ARTHRODESIS, INTERPHALANGEAL JOINT ARTHRODESIS HALLUX, MULTIPLE METATARSAL OSTEOTOMIES, HAMMER TOE CORRECTION DIGITS 2,4    Surgeon(s):  Valdemar Valentin MD    Assistant:  Surgical Assistant: Billy Eaton Assistant: VINEET Li    Anesthesia: General    Estimated Blood Loss (mL): less than 50     Complications: None    Specimens:   * No specimens in log *    Implants:  * No implants in log *      Drains: * No LDAs found *    Findings: See Above.      Electronically signed by Valdemar Valentin MD on 12/2/2021 at 11:28 AM

## 2021-12-03 LAB — MRSA SCREEN RT-PCR: NORMAL

## 2021-12-03 NOTE — OP NOTE
[address]                                OPERATIVE REPORT    PATIENT NAME: Tiffanie Romeo                      :        1934  MED REC NO:   [id number]                          ROOM:  ACCOUNT NO:   [de-identified]                           ADMIT DATE: 2021  PROVIDER:     Pietro Couch. Cherylene Biles, MD    DATE OF PROCEDURE:  2021    SURGEON:  Pietro Couch. Cherylene Biles, MD    SECOND SURGEON:  Nacho Brooks PA-C    PREOPERATIVE DIAGNOSES:  Right,  1. Hallux rigidus. 2.  Hallux valgus. 3.  Metatarsalgia. 4.  Hammertoe deformity, digits two and four. 5.  Clawed forefoot deformity. POSTOPERATIVE DIAGNOSES:  Right,  1. Hallux rigidus. 2.  Hallux valgus. 3.  Metatarsalgia. 4.  Hammertoe deformity, digits two and four. 5.  Clawed forefoot deformity. OPERATION:  Right,  1. Hallux metatarsophalangeal joint arthrodesis. 2.  Hallux interphalangeal joint arthrodesis. 3.  Multiple metatarsal osteotomies (Weil-type osteotomies, metatarsals  two and three). 4.  Hammertoe correction, digits two and four. 5.  Metatarsophalangeal joint capsulotomy with extensor digitorum longus  tendon lengthening, digits two, three and four. ANESTHETIC:  Ankle block with MAC. INDICATIONS:  This is an 66-year-old woman with history of severe right  forefoot deformity. The patient has had previous forefoot  reconstructive surgery which has left her disabled and unable to walk. She had developed significant deformities and pain following her initial  surgery and has elected to have this revised in an effort to improve her  pain and discomfort. The risks and potential benefits of the procedure  were discussed in length with the patient. She understands these, was  given the opportunity to ask questions. Her questions were answered to  her satisfaction.   She has given consent to proceed with the  above-outlined procedures. OPERATIVE PROCEDURE:  The patient was brought to the operating room,  placed in the supine position on the operating table. After induction  of general anesthetic, a pneumatic tourniquet was placed on the  patient's right proximal thigh and set to 300 mmHg. The right leg was  then prepped and draped free in the usual sterile fashion. A second surgeon was present throughout the procedure and this was  necessary due to the complex nature of the deformity and the revision  nature of the procedure. A previous surgery had left severe deformity  as well as marked scarring of the soft tissues. A second surgeon was  necessary to aid with correction of major deformity and to aid with  identification and protection of neurologic and vascular structures in  the scarred soft tissue envelope. A second surgeon was necessary to  decrease overall operative time and to improve the patient's safety and  outcome. An assistant with these skills was not available from the  hospital at the time of the procedure necessitating Lucero Poe  presence. HALLUX INTERPHALANGEAL JOINT ARTHRODESIS:  At this point, an incision  was made over the dorsal aspect of the interphalangeal joint of the  hallux. This was a transverse incision allowing good exposure  proximally and distally. The joint was fixed in a rigidly flexed  position and a high-speed handheld oscillating saw was used to resect  the distal condyles of the proximal phalanx and the proximal condyles of  the distal phalanx. Enough bone was removed that the digit could be  brought to a neutral axial position without undue tension and this was  predrilled for a Synthes 7.3 mm cannulated screw. Final fixation was  done during application of fixation for the hallux metatarsophalangeal  joint fusion. HALLUX METATARSOPHALANGEAL JOINT ARTHRODESIS:  At this point, attention  was turned to the hallux metatarsophalangeal joint fusion.   Incision was  made over the dorsal aspect of the hallux metatarsophalangeal joint and  dissection carried out through the subcutaneous and deeper tissue. A  rigid dorsal soft tissue contracture had occurred and Z-lengthening of  the extensor tendons was performed. A meshed and cut arthrodesis bed  was created with a high-speed handheld oscillating saw resecting bone  from the first metatarsal head and base of the proximal phalanx. Enough  bone was removed to allow the digit to be brought to a neutral axial  position without undue soft tissue tension. Cancellous bony surfaces  were appreciated at all fusion sites. The resected bone was milled in a  bone mill and packed throughout the fusion site as a autograft bone  graft. Final fixation was achieved with a Synthes 7.3 mm cannulated  screw. This was applied from the tip of the digit across the distal  interphalangeal joint through the middle phalanx and across the  metatarsophalangeal joint into the metatarsal.  This was the first  pinned with a guidewire, drilled, pre-tapped, and a single screw applied  in compression mode. This afforded a rigid and excellent compression  and stability of both fusion sites. Excellent alignment was noted. Positioning was verified with multiplanar fluoroscopy. METATARSOPHALANGEAL JOINT CAPSULOTOMY WITH EXTENSOR TENDON LENGTHENING  DIGITS TWO, THREE, FOUR:  At this point, attention was turned to the  second, third and fourth digits. Rigid dorsal soft tissue contractures  had occurred causing marked dorsal subluxation of the  metatarsophalangeal joints. Incision was made between the second and  third and over the fourth metatarsophalangeal joint. The extensor  tendons were identified and noted to be markedly contracted. Z-lengthening of the tendons was performed with a #15 scalpel allowing  good exposure of the dorsal capsule of the metatarsophalangeal joints.    A dorsal capsulotomy and capsulectomy was performed at the second, third  and fourth metatarsophalangeal joint dramatically improving the dorsal  soft tissue contracture and allowing the digits to be brought to a  neutral axial position. HAMMERTOE CORRECTION, DIGITS TWO AND FOUR:  There remained a rigid and  fixed flexion deformity at the distal interphalangeal joints of the  second and fourth digits. The third digit had corrected with the dorsal  soft tissue release and did not require further surgery. Incision was  therefore made over the dorsal aspect of the second and third digits and  the interphalangeal joints were prepared for arthrodesis. Previous  surgery had been done on the second proximal interphalangeal joint. This had left the digit in significant varus and rigid flexion. These  joints were resected with a high-speed handheld oscillating saw exposing  cancellous bone and shortening the digits enough that a neutral axial  position could be achieved without undue soft tissue tension. The  second and fourth digits were then pinned with a guidewire for the  [employer] 2.6 mm headless compression screw and a screw applied  in each digit axially. This afforded rigid and excellent compression  and stability of the proximal and distal interphalangeal joints. This  afforded a neutrally aligned digit which was felt to be quite good. The  fifth toe was noted to have very good alignment and did not require  surgical correction or soft tissue releases. MULTIPLE METATARSAL OSTEOTOMIES, METATARSALS TWO AND THREE:  At this  point, attention was turned to prominence of the second and third  metatarsals. An incision was made dorsally over the second and third  metatarsals, centered between them and dissection carried out medially  and laterally. A subperiosteal dissection was performed. A high-speed  handheld oscillating saw was used to create Weil osteotomies in the  second and third metatarsal distal metaphysis and diaphysis.   A second  pass was made allowing the capital

## 2022-02-01 ENCOUNTER — OFFICE VISIT (OUTPATIENT)
Dept: ORTHOPEDIC SURGERY | Age: 87
End: 2022-02-01
Payer: MEDICARE

## 2022-02-01 VITALS — WEIGHT: 140 LBS | BODY MASS INDEX: 25.76 KG/M2 | HEIGHT: 62 IN

## 2022-02-01 DIAGNOSIS — M75.121 NONTRAUMATIC COMPLETE TEAR OF RIGHT ROTATOR CUFF: ICD-10-CM

## 2022-02-01 DIAGNOSIS — M25.511 BILATERAL SHOULDER PAIN, UNSPECIFIED CHRONICITY: Primary | ICD-10-CM

## 2022-02-01 DIAGNOSIS — M25.512 BILATERAL SHOULDER PAIN, UNSPECIFIED CHRONICITY: Primary | ICD-10-CM

## 2022-02-01 DIAGNOSIS — M19.012 OSTEOARTHRITIS OF LEFT SHOULDER, UNSPECIFIED OSTEOARTHRITIS TYPE: ICD-10-CM

## 2022-02-01 PROBLEM — C44.91 BASAL CELL CARCINOMA OF SKIN: Status: ACTIVE | Noted: 2022-02-01

## 2022-02-01 PROBLEM — M17.10 ARTHRITIS OF KNEE: Status: ACTIVE | Noted: 2019-09-13

## 2022-02-01 PROBLEM — M17.12 PRIMARY OSTEOARTHRITIS OF LEFT KNEE: Status: ACTIVE | Noted: 2019-07-18

## 2022-02-01 PROBLEM — K80.20 CALCULUS OF GALLBLADDER WITHOUT CHOLECYSTITIS WITHOUT OBSTRUCTION: Status: ACTIVE | Noted: 2019-07-08

## 2022-02-01 PROCEDURE — 99203 OFFICE O/P NEW LOW 30 MIN: CPT | Performed by: ORTHOPAEDIC SURGERY

## 2022-02-01 PROCEDURE — G8417 CALC BMI ABV UP PARAM F/U: HCPCS | Performed by: ORTHOPAEDIC SURGERY

## 2022-02-01 PROCEDURE — G8484 FLU IMMUNIZE NO ADMIN: HCPCS | Performed by: ORTHOPAEDIC SURGERY

## 2022-02-01 PROCEDURE — 1090F PRES/ABSN URINE INCON ASSESS: CPT | Performed by: ORTHOPAEDIC SURGERY

## 2022-02-01 PROCEDURE — G8427 DOCREV CUR MEDS BY ELIG CLIN: HCPCS | Performed by: ORTHOPAEDIC SURGERY

## 2022-02-01 RX ORDER — B-COMPLEX WITH VITAMIN C
1 TABLET ORAL DAILY
COMMUNITY

## 2022-02-01 NOTE — LETTER
Shoulder Elbow Rehabilitation Referral    Patient Name: Chelsea Juarez      YOB: 1934    Diagnosis:   1. Bilateral shoulder pain, unspecified chronicity    Right shoulder Chronic cuff tear  Left shoulder acute cuff tear    Precautions:  Post Op Instructions:  [] Continuous passive motion (CPM)  [] Elbow range of motion  [] Exercise in plane of scapula   []  Strengthening     [] Pulley and instruction    [x] Home exercise program (copy to patient)   [] Sling when arm at risk  [] Sling or brace at all times   [] AAROM: Forward elevation to           [] AAROM: External rotation to  [] Isometric external rotator strengthening [] AAROM: internal rotation: up the back  [] Isometric abductor strengthening  [] AAROM: Internal abduction     [] Isometric internal rotator strengthening [] AAROM: cross-body adduction             Stretching:     Strengthening:  [x] Four quadrant (FE, ER, IR, CBA)  [x] Rotator cuff (ER, IR, Abd)  [] Forward Elevation    [] External Rotators     [] External Rotation    [] Internal Rotators  [] Internal Rotation: up/back   [] Abductors     [] Internal Rotation: supine in abduction  [] Flexors  [] Cross-body abduction    [] Extensors  [] Pendulum (FE, Abd/Add, cw/ccw)  [x] Scapular Stabilizers   [] Wall-walking (FE, Abd)    [x] Shoulder shrugs     [] Table slides      [x] Rhomboid pinch  [] Elbow (flex, ext, pron, sup)    [] Lat.  Pull downs     [] Medial epicondylitis program    [] Forward punch   [] Lateral epicondylitis program    [] Internal rotators     [] Progressive resistive exercises  [] Bench Press        [] Bench press plus  Activities:     [] Lateral pull-downs  [] Rowing     [] Progressive two-hand supine press  [] Stepper/Exercise bike   [] Biceps: curls/supination  [] Swimming  [] Water exercises    Modalities: PRN    Return to Sport:  [] Ultrasound     [] Plyometrics  [] Iontophoresis     [] Rhythmic stabilization  [] Moist heat     [] Core strengthening   [] Massage     [] Sports specific program:   [x] Cryotherapy      [] Electrical stimulation     [] Paraffin  [] Whirlpool  [] TENS    [x] Home exercise program (copy to patient). Perform exercises for:   15     minutes    2-3      times/day  [x] Supervised physical therapy  Frequency: [x]  1x week  [] 2x week  [] 3x week  [] Other:   Duration: [] 2 weeks   [x] 4 weeks  [] 6 weeks  [] Other:     Additional Instructions:          Josh Oakley MD, PhD

## 2022-02-01 NOTE — PROGRESS NOTES
Chief Complaint    Shoulder Pain (NP BILAT SHOULDER)      History of Present Illness:  Apolinar Garcia is a pleasant, RHD 80 y.o. female here today on referral from Dr. Maurice Silva for consultation and evaluation of bilateral shoulder pain. She has had shoulder pain for several years it has worsened recently her left shoulder is worse than her right. Although she is having shoulder pain she has continued to lead a fairly active lifestyle and overall feels that her shoulders are in fairly good shape      Medical History:    No notes on file    Patient's medications, allergies, past medical, surgical, social and family histories were reviewed and updated as appropriate. Past Medical History:   Diagnosis Date    [facility]     Breast cancer ([facility]     right    Cancer ([facility]     Deaf, right     History of gallstones     Thyroid disease       Social History     Socioeconomic History    Marital status:      Spouse name: Not on file    Number of children: Not on file    Years of education: Not on file    Highest education level: Not on file   Occupational History    Not on file   Tobacco Use    Smoking status: Never Smoker    Smokeless tobacco: Never Used   Vaping Use    Vaping Use: Never used   Substance and Sexual Activity    Alcohol use: No    Drug use: Never    Sexual activity: Not Currently   Other Topics Concern    Not on file   Social History Narrative    Not on file     Social Determinants of Health     Financial Resource Strain:     Difficulty of Paying Living Expenses: Not on file   Food Insecurity:     Worried About Running Out of Food in the Last Year: Not on file    Johny of Food in the Last Year: Not on file   Transportation Needs:     Lack of Transportation (Medical): Not on file    Lack of Transportation (Non-Medical):  Not on file   Physical Activity:     Days of Exercise per Week: Not on file    Minutes of Exercise per Session: Not on file   Stress:     Feeling of Stress : Not on file   Social Connections:     Frequency of Communication with Friends and Family: Not on file    Frequency of Social Gatherings with Friends and Family: Not on file    Attends Jewish Services: Not on file    Active Member of Clubs or Organizations: Not on file    Attends Club or Organization Meetings: Not on file    Marital Status: Not on file   Intimate Partner Violence:     Fear of Current or Ex-Partner: Not on file    Emotionally Abused: Not on file    Physically Abused: Not on file    Sexually Abused: Not on file   Housing Stability:     Unable to Pay for Housing in the Last Year: Not on file    Number of [place] in the Last Year: Not on file    Unstable Housing in the Last Year: Not on file       No Known Allergies  Current Outpatient Medications on File Prior to Visit   Medication Sig Dispense Refill    Calcium Carbonate-Vitamin D (OYSTER SHELL CALCIUM/D) 500-200 MG-UNIT TABS Take 1 tablet by mouth daily      cyanocobalamin 100 MCG tablet Take 100 mcg by mouth daily      levothyroxine (SYNTHROID) 25 MCG tablet Take 25 mcg by mouth daily       raloxifene (EVISTA) 60 MG tablet Take 60 mg by mouth daily. No current facility-administered medications on file prior to visit. Review of Systems  A 14 point review of systems was completed by the patient on 2/1/2022 and is available in the media section of the scanned medical record and was reviewed on 2/1/2022. The review is negative with the exception of those things mentioned in the HPI and Past Medical History    Vital Signs: There were no vitals filed for this visit. General Exam:   Constitutional: Patient is adequately groomed with no evidence of malnutrition  DTRs: Deep tendon reflexes are intact  Mental Status: The patient is oriented to time, place and person. The patient's mood and affect are appropriate.   Lymphatic: The lymphatic examination bilaterally reveals all areas to be without enlargement or induration. Vascular: Examination reveals no swelling or calf tenderness. Peripheral pulses are palpable and 2+. Neurological: The patient has good coordination. There is no weakness or sensory deficit. Right shoulder Examination:    Inspection:  No skin lesions, no deformity, no swelling. Active Range of Motion: Forward Elevation 120, Abduction 110, External Rotation 30, Internal Rotation T9    Passive Range of Motion: Minimal change    Strength:  External Rotation 4/5, Internal Rotation 5/5, Champagne Toast 4/5, Supraspinatus 4/5    Special Tests: Positive Halle's, positive Hawkin's, No Thom deformity. Neurovascular: Palpable radial pulse, normal sensation in the median, ulnar, radial nerve distributions        Comparison left shoulder Examination:    Inspection:  No skin lesions, no deformity, no swelling. Active Range of Motion: Forward Elevation 120, Abduction 100, External Rotation 30, Internal Rotation T9    Passive Range of Motion: Unchanged    Strength: 4+/5 throughout the rotator cuff    Special Tests:  No Thom Deformity    Neurovascular:  Palpable radial pulse, normal sensation in the median, ulnar, radial nerve distributions      Self assessment questionnaires were completed today. Radiology:     Plain radiographs of the bilateral shoulders, comprising 3 views: AP, Scapular Y and Axillary lateral were  obtained and reviewed in the office:    Impression: Right shoulder has chronic massive rotator cuff tear with humeral head elevation, left shoulder with 5 mild glenohumeral joint osteoarthritis with inferior humeral osteophyte and joint space narrowing         Assessment :  [place] is a pleasant 80 y.o. female with pain related to bilateral shoulder pain. On the right she has  a chronic massive rotator cuff tear an on the left she has osteoarthritis and also a small rotator cuff tear.   Conservative treatment is most physically present and personally supervised the Orthopaedic Sports Medicine Fellow in the evaluation and development of a treatment plan for this patient. I personally interviewed the patient and performed a physical examination. In addition, I discussed the patient's condition and treatment options with them. I have also reviewed and agree with the past medical, family and social history unless otherwise noted. All of the patient's questions were answered. Josh Lowe MD, PhD  2/1/2022

## 2024-05-09 ENCOUNTER — APPOINTMENT (OUTPATIENT)
Dept: GENERAL RADIOLOGY | Age: 89
End: 2024-05-09
Payer: COMMERCIAL

## 2024-05-09 ENCOUNTER — APPOINTMENT (OUTPATIENT)
Dept: MRI IMAGING | Age: 89
End: 2024-05-09
Payer: COMMERCIAL

## 2024-05-09 ENCOUNTER — HOSPITAL ENCOUNTER (OUTPATIENT)
Age: 89
Setting detail: OBSERVATION
Discharge: HOME | End: 2024-05-10
Attending: INTERNAL MEDICINE | Admitting: INTERNAL MEDICINE
Payer: COMMERCIAL

## 2024-05-09 ENCOUNTER — APPOINTMENT (OUTPATIENT)
Dept: CT IMAGING | Age: 89
End: 2024-05-09
Payer: COMMERCIAL

## 2024-05-09 ENCOUNTER — APPOINTMENT (OUTPATIENT)
Dept: ULTRASOUND IMAGING | Age: 89
End: 2024-05-09
Payer: COMMERCIAL

## 2024-05-09 DIAGNOSIS — I63.9 CEREBROVASCULAR ACCIDENT (CVA), UNSPECIFIED MECHANISM (HCC): ICD-10-CM

## 2024-05-09 DIAGNOSIS — R29.90 STROKE-LIKE SYMPTOMS: Primary | ICD-10-CM

## 2024-05-09 DIAGNOSIS — D72.829 LEUKOCYTOSIS, UNSPECIFIED TYPE: ICD-10-CM

## 2024-05-09 PROBLEM — G45.9 TIA (TRANSIENT ISCHEMIC ATTACK): Status: ACTIVE | Noted: 2024-05-09

## 2024-05-09 LAB
ALBUMIN SERPL-MCNC: 3.9 G/DL (ref 3.4–5)
ALBUMIN/GLOB SERPL: 1.4 {RATIO} (ref 1.1–2.2)
ALP SERPL-CCNC: 61 U/L (ref 40–129)
ALT SERPL-CCNC: 28 U/L (ref 10–40)
ANION GAP SERPL CALCULATED.3IONS-SCNC: 9 MMOL/L (ref 3–16)
AST SERPL-CCNC: 48 U/L (ref 15–37)
BASOPHILS # BLD: 0.1 K/UL (ref 0–0.2)
BASOPHILS NFR BLD: 1 %
BILIRUB SERPL-MCNC: 0.7 MG/DL (ref 0–1)
BILIRUB UR QL STRIP.AUTO: NEGATIVE
BUN SERPL-MCNC: 16 MG/DL (ref 7–20)
CALCIUM SERPL-MCNC: 8.8 MG/DL (ref 8.3–10.6)
CHLORIDE SERPL-SCNC: 96 MMOL/L (ref 99–110)
CLARITY UR: CLEAR
CO2 SERPL-SCNC: 26 MMOL/L (ref 21–32)
COLOR UR: YELLOW
CREAT SERPL-MCNC: 0.6 MG/DL (ref 0.6–1.2)
DEPRECATED RDW RBC AUTO: 13.5 % (ref 12.4–15.4)
EKG ATRIAL RATE: 94 BPM
EKG DIAGNOSIS: NORMAL
EKG P AXIS: 31 DEGREES
EKG P-R INTERVAL: 162 MS
EKG Q-T INTERVAL: 376 MS
EKG QRS DURATION: 88 MS
EKG QTC CALCULATION (BAZETT): 470 MS
EKG R AXIS: -59 DEGREES
EKG T AXIS: 10 DEGREES
EKG VENTRICULAR RATE: 94 BPM
EOSINOPHIL # BLD: 0.1 K/UL (ref 0–0.6)
EOSINOPHIL NFR BLD: 1 %
GFR SERPLBLD CREATININE-BSD FMLA CKD-EPI: 85 ML/MIN/{1.73_M2}
GLUCOSE BLD-MCNC: 97 MG/DL (ref 70–99)
GLUCOSE SERPL-MCNC: 91 MG/DL (ref 70–99)
GLUCOSE UR STRIP.AUTO-MCNC: NEGATIVE MG/DL
HCT VFR BLD AUTO: 36.6 % (ref 36–48)
HGB BLD-MCNC: 12.5 G/DL (ref 12–16)
HGB UR QL STRIP.AUTO: NEGATIVE
INR PPP: 0.89 (ref 0.85–1.15)
KETONES UR STRIP.AUTO-MCNC: NEGATIVE MG/DL
LACTATE BLDV-SCNC: 1.5 MMOL/L (ref 0.4–1.9)
LEUKOCYTE ESTERASE UR QL STRIP.AUTO: NEGATIVE
LYMPHOCYTES # BLD: 1.8 K/UL (ref 1–5.1)
LYMPHOCYTES NFR BLD: 13 %
MCH RBC QN AUTO: 33 PG (ref 26–34)
MCHC RBC AUTO-ENTMCNC: 34.2 G/DL (ref 31–36)
MCV RBC AUTO: 96.6 FL (ref 80–100)
MONOCYTES # BLD: 0.7 K/UL (ref 0–1.3)
MONOCYTES NFR BLD: 5 %
NEUTROPHILS # BLD: 11 K/UL (ref 1.7–7.7)
NEUTROPHILS NFR BLD: 80 %
NEUTS VAC BLD QL SMEAR: PRESENT
NITRITE UR QL STRIP.AUTO: NEGATIVE
PERFORMED ON: NORMAL
PH UR STRIP.AUTO: 7 [PH] (ref 5–8)
PLATELET # BLD AUTO: 197 K/UL (ref 135–450)
PLATELET BLD QL SMEAR: ADEQUATE
PMV BLD AUTO: 7.2 FL (ref 5–10.5)
POTASSIUM SERPL-SCNC: 5 MMOL/L (ref 3.5–5.1)
PROT SERPL-MCNC: 6.6 G/DL (ref 6.4–8.2)
PROT UR STRIP.AUTO-MCNC: NEGATIVE MG/DL
PROTHROMBIN TIME: 12.3 SEC (ref 11.9–14.9)
RBC # BLD AUTO: 3.78 M/UL (ref 4–5.2)
RBC MORPH BLD: NORMAL
SLIDE REVIEW: ABNORMAL
SODIUM SERPL-SCNC: 131 MMOL/L (ref 136–145)
SP GR UR STRIP.AUTO: 1.02 (ref 1–1.03)
TOXIC GRANULES BLD QL SMEAR: PRESENT
TROPONIN, HIGH SENSITIVITY: 23 NG/L (ref 0–14)
UA COMPLETE W REFLEX CULTURE PNL UR: NORMAL
UA DIPSTICK W REFLEX MICRO PNL UR: NORMAL
URN SPEC COLLECT METH UR: NORMAL
UROBILINOGEN UR STRIP-ACNC: 0.2 E.U./DL
WBC # BLD AUTO: 13.7 K/UL (ref 4–11)

## 2024-05-09 PROCEDURE — 94760 N-INVAS EAR/PLS OXIMETRY 1: CPT

## 2024-05-09 PROCEDURE — 84484 ASSAY OF TROPONIN QUANT: CPT

## 2024-05-09 PROCEDURE — 71045 X-RAY EXAM CHEST 1 VIEW: CPT

## 2024-05-09 PROCEDURE — G0378 HOSPITAL OBSERVATION PER HR: HCPCS

## 2024-05-09 PROCEDURE — 80053 COMPREHEN METABOLIC PANEL: CPT

## 2024-05-09 PROCEDURE — 2580000003 HC RX 258: Performed by: INTERNAL MEDICINE

## 2024-05-09 PROCEDURE — 70498 CT ANGIOGRAPHY NECK: CPT

## 2024-05-09 PROCEDURE — 93010 ELECTROCARDIOGRAM REPORT: CPT | Performed by: INTERNAL MEDICINE

## 2024-05-09 PROCEDURE — 96360 HYDRATION IV INFUSION INIT: CPT

## 2024-05-09 PROCEDURE — 76536 US EXAM OF HEAD AND NECK: CPT

## 2024-05-09 PROCEDURE — 93005 ELECTROCARDIOGRAM TRACING: CPT | Performed by: PHYSICIAN ASSISTANT

## 2024-05-09 PROCEDURE — 85025 COMPLETE CBC W/AUTO DIFF WBC: CPT

## 2024-05-09 PROCEDURE — 70551 MRI BRAIN STEM W/O DYE: CPT

## 2024-05-09 PROCEDURE — 83605 ASSAY OF LACTIC ACID: CPT

## 2024-05-09 PROCEDURE — 2580000003 HC RX 258: Performed by: PHYSICIAN ASSISTANT

## 2024-05-09 PROCEDURE — 6370000000 HC RX 637 (ALT 250 FOR IP): Performed by: PHYSICIAN ASSISTANT

## 2024-05-09 PROCEDURE — 87040 BLOOD CULTURE FOR BACTERIA: CPT

## 2024-05-09 PROCEDURE — 96361 HYDRATE IV INFUSION ADD-ON: CPT

## 2024-05-09 PROCEDURE — 6370000000 HC RX 637 (ALT 250 FOR IP): Performed by: INTERNAL MEDICINE

## 2024-05-09 PROCEDURE — 99285 EMERGENCY DEPT VISIT HI MDM: CPT

## 2024-05-09 PROCEDURE — 85610 PROTHROMBIN TIME: CPT

## 2024-05-09 PROCEDURE — 81003 URINALYSIS AUTO W/O SCOPE: CPT

## 2024-05-09 PROCEDURE — 70450 CT HEAD/BRAIN W/O DYE: CPT

## 2024-05-09 PROCEDURE — 6360000004 HC RX CONTRAST MEDICATION: Performed by: PHYSICIAN ASSISTANT

## 2024-05-09 RX ORDER — SODIUM CHLORIDE 0.9 % (FLUSH) 0.9 %
5-40 SYRINGE (ML) INJECTION PRN
Status: DISCONTINUED | OUTPATIENT
Start: 2024-05-09 | End: 2024-05-10 | Stop reason: HOSPADM

## 2024-05-09 RX ORDER — ONDANSETRON 4 MG/1
4 TABLET, ORALLY DISINTEGRATING ORAL EVERY 8 HOURS PRN
Status: DISCONTINUED | OUTPATIENT
Start: 2024-05-09 | End: 2024-05-10 | Stop reason: HOSPADM

## 2024-05-09 RX ORDER — UBIDECARENONE 75 MG
100 CAPSULE ORAL DAILY
Status: DISCONTINUED | OUTPATIENT
Start: 2024-05-09 | End: 2024-05-10 | Stop reason: HOSPADM

## 2024-05-09 RX ORDER — SODIUM CHLORIDE 9 MG/ML
INJECTION, SOLUTION INTRAVENOUS PRN
Status: DISCONTINUED | OUTPATIENT
Start: 2024-05-09 | End: 2024-05-10 | Stop reason: HOSPADM

## 2024-05-09 RX ORDER — GABAPENTIN 300 MG/1
300 CAPSULE ORAL 3 TIMES DAILY
COMMUNITY
Start: 2024-02-23

## 2024-05-09 RX ORDER — SODIUM CHLORIDE, SODIUM LACTATE, POTASSIUM CHLORIDE, AND CALCIUM CHLORIDE .6; .31; .03; .02 G/100ML; G/100ML; G/100ML; G/100ML
30 INJECTION, SOLUTION INTRAVENOUS ONCE
Status: COMPLETED | OUTPATIENT
Start: 2024-05-09 | End: 2024-05-09

## 2024-05-09 RX ORDER — ASPIRIN 300 MG/1
300 SUPPOSITORY RECTAL DAILY
Status: DISCONTINUED | OUTPATIENT
Start: 2024-05-10 | End: 2024-05-10 | Stop reason: HOSPADM

## 2024-05-09 RX ORDER — LEVOTHYROXINE SODIUM 0.03 MG/1
25 TABLET ORAL
Status: DISCONTINUED | OUTPATIENT
Start: 2024-05-09 | End: 2024-05-10 | Stop reason: HOSPADM

## 2024-05-09 RX ORDER — ASPIRIN 81 MG/1
81 TABLET, CHEWABLE ORAL ONCE
Status: COMPLETED | OUTPATIENT
Start: 2024-05-09 | End: 2024-05-09

## 2024-05-09 RX ORDER — ENOXAPARIN SODIUM 100 MG/ML
40 INJECTION SUBCUTANEOUS DAILY
Status: DISCONTINUED | OUTPATIENT
Start: 2024-05-10 | End: 2024-05-10 | Stop reason: HOSPADM

## 2024-05-09 RX ORDER — ATORVASTATIN CALCIUM 80 MG/1
80 TABLET, FILM COATED ORAL NIGHTLY
Status: DISCONTINUED | OUTPATIENT
Start: 2024-05-09 | End: 2024-05-10 | Stop reason: HOSPADM

## 2024-05-09 RX ORDER — ASPIRIN 81 MG/1
81 TABLET, CHEWABLE ORAL DAILY
Status: DISCONTINUED | OUTPATIENT
Start: 2024-05-10 | End: 2024-05-10 | Stop reason: HOSPADM

## 2024-05-09 RX ORDER — POLYETHYLENE GLYCOL 3350 17 G/17G
17 POWDER, FOR SOLUTION ORAL DAILY PRN
Status: DISCONTINUED | OUTPATIENT
Start: 2024-05-09 | End: 2024-05-10 | Stop reason: HOSPADM

## 2024-05-09 RX ORDER — ONDANSETRON 2 MG/ML
4 INJECTION INTRAMUSCULAR; INTRAVENOUS EVERY 6 HOURS PRN
Status: DISCONTINUED | OUTPATIENT
Start: 2024-05-09 | End: 2024-05-10 | Stop reason: HOSPADM

## 2024-05-09 RX ORDER — GABAPENTIN 300 MG/1
300 CAPSULE ORAL 3 TIMES DAILY
Status: DISCONTINUED | OUTPATIENT
Start: 2024-05-09 | End: 2024-05-10 | Stop reason: HOSPADM

## 2024-05-09 RX ORDER — METHOCARBAMOL 750 MG/1
750 TABLET, FILM COATED ORAL 3 TIMES DAILY PRN
Status: DISCONTINUED | OUTPATIENT
Start: 2024-05-09 | End: 2024-05-10 | Stop reason: HOSPADM

## 2024-05-09 RX ORDER — METHOCARBAMOL 750 MG/1
750 TABLET, FILM COATED ORAL 3 TIMES DAILY PRN
COMMUNITY
Start: 2024-04-12

## 2024-05-09 RX ORDER — ASPIRIN 81 MG/1
81 TABLET, CHEWABLE ORAL DAILY
COMMUNITY
Start: 2022-09-27

## 2024-05-09 RX ORDER — RALOXIFENE HYDROCHLORIDE 60 MG/1
60 TABLET, FILM COATED ORAL DAILY
Status: DISCONTINUED | OUTPATIENT
Start: 2024-05-09 | End: 2024-05-10 | Stop reason: HOSPADM

## 2024-05-09 RX ORDER — SODIUM CHLORIDE 0.9 % (FLUSH) 0.9 %
5-40 SYRINGE (ML) INJECTION EVERY 12 HOURS SCHEDULED
Status: DISCONTINUED | OUTPATIENT
Start: 2024-05-09 | End: 2024-05-10 | Stop reason: HOSPADM

## 2024-05-09 RX ADMIN — Medication 10 ML: at 21:17

## 2024-05-09 RX ADMIN — GABAPENTIN 300 MG: 300 CAPSULE ORAL at 13:11

## 2024-05-09 RX ADMIN — ATORVASTATIN CALCIUM 80 MG: 80 TABLET, FILM COATED ORAL at 21:18

## 2024-05-09 RX ADMIN — GABAPENTIN 300 MG: 300 CAPSULE ORAL at 21:18

## 2024-05-09 RX ADMIN — METHOCARBAMOL TABLETS 750 MG: 750 TABLET, COATED ORAL at 13:11

## 2024-05-09 RX ADMIN — Medication 1 TABLET: at 12:39

## 2024-05-09 RX ADMIN — Medication 100 MCG: at 12:39

## 2024-05-09 RX ADMIN — RALOXIFENE HYDROCHLORIDE 60 MG: 60 TABLET, FILM COATED ORAL at 12:39

## 2024-05-09 RX ADMIN — ASPIRIN 81 MG: 81 TABLET, CHEWABLE ORAL at 08:33

## 2024-05-09 RX ADMIN — SODIUM CHLORIDE, POTASSIUM CHLORIDE, SODIUM LACTATE AND CALCIUM CHLORIDE 1503 ML: 600; 310; 30; 20 INJECTION, SOLUTION INTRAVENOUS at 08:33

## 2024-05-09 RX ADMIN — LEVOTHYROXINE SODIUM 25 MCG: 0.03 TABLET ORAL at 12:39

## 2024-05-09 RX ADMIN — IOPAMIDOL 75 ML: 755 INJECTION, SOLUTION INTRAVENOUS at 06:47

## 2024-05-09 ASSESSMENT — PAIN DESCRIPTION - DESCRIPTORS: DESCRIPTORS: ACHING

## 2024-05-09 ASSESSMENT — PAIN SCALES - GENERAL
PAINLEVEL_OUTOF10: 0
PAINLEVEL_OUTOF10: 1

## 2024-05-09 ASSESSMENT — PAIN DESCRIPTION - ORIENTATION: ORIENTATION: MID

## 2024-05-09 ASSESSMENT — PAIN SCALES - WONG BAKER: WONGBAKER_NUMERICALRESPONSE: NO HURT

## 2024-05-09 ASSESSMENT — PAIN - FUNCTIONAL ASSESSMENT: PAIN_FUNCTIONAL_ASSESSMENT: NONE - DENIES PAIN

## 2024-05-09 ASSESSMENT — PAIN DESCRIPTION - LOCATION: LOCATION: BACK

## 2024-05-09 NOTE — ED NOTES
Pharmacy Medication Reconciliation Note     List of medications patient is currently taking is complete.    Source of information:   EMR  Patient at bedside    Notes regarding home medications:   Reports taking gabapentin for back pain      Frankie Seo PharmD  5/9/2024  12:02 PM

## 2024-05-09 NOTE — CONSULTS
Neurology Consult Note  Reason for Consult: TIA    Chief complaint: off balance    Dr Jay, Sid LEIVA MD asked me to see Margaret Plaza in consultation for evaluation of TIA    History of Present Illness:  Margaret Plaza is a 89 y.o. female who presents with feeling off balance.    I obtained my information via interview w/ the patient and her family at the bedside, supplemented by chart review.      The patient says that she got up in the middle of the night earlier today and just felt really off balance.  She was unable to walk straight.  She was afraid that she might fall.  She doesn't really describe a dizzy or spinning feeling, just off balance.  She eventually was able to sit down and continued feel that somewhat was just off.  She was unable to offer much more detail.  She was having to use a cane to help keep her balance when she was up.  She denies any additional focal neurologic deficits.  She ended up coming to the ED just prior to 0630.      Initial BP was 132/89.  CT head and CTA head/neck were unrevealing.  NIH 0.  She said she felt better around 0700.      Currently she feels fine though hasn't been out of bed.      She does have a hx of dementia.      A few months ago she had a similar episode when leaving a restaurant.  This was attributed to possible dehydration.  She admittedly says that she probably doesn't drink enough.      Family is reported a hx of atrial fibrillation, diagnosed perhaps in the last year or so.  I am unable to find any documentation of this.      Medical History:  Past Medical History:   Diagnosis Date    Atrial fibrillation (HCC)     Breast cancer (HCC)     right    Cancer (HCC)     Deaf, right     History of gallstones     Thyroid disease      Past Surgical History:   Procedure Laterality Date    ARTHRODESIS Right 12/2/2021    RIGHT HALLUX METATARSOPHALANGEAL JOINT ARTHRODESIS, INTERPHALANGEAL JOINT ARTHRODESIS HALLUX, MULTIPLE METATARSAL OSTEOTOMIES, HAMMER TOE  No diplopia. No photophobia.  Ears/nose/throat- No dysphagia. No Dysarthria  Cardiovascular- No palpitations. No chest pain  Respiratory- No dyspnea. No Cough  Gastrointestinal- No Abdominal pain. No Vomiting.  Genitourinary- No incontinence. No urinary retention  Musculoskeletal- No myalgia. No arthralgia  Skin- No rash. No easy bruising.  Psychiatric- No depression. No anxiety  Endocrine- No diabetes. No thyroid issues.  Hematologic- No bleeding difficulty. No fatigue  Neurologic- No weakness. No Headache.    Exam  Blood pressure (!) 156/75, pulse 86, temperature 97.9 °F (36.6 °C), temperature source Oral, resp. rate 18, height 1.575 m (5' 2\"), weight 68.2 kg (150 lb 5.7 oz), SpO2 96 %.  Constitutional    Vital signs: BP, HR, and RR reviewed   General alert, no distress  Eyes: unable to visualize the fundi  Cardiovascular: no peripheral edema.  Psychiatric: cooperative with examination, no psychotic behavior noted.  Neurologic  Mental status:   orientation to person, place.     General fund of knowledge grossly intact   Memory some degree of dementia at baseline.    Attention intact as able to attend well to the exam     Language fluent in conversation   Comprehension intact; follows simple commands  Cranial nerves:   CN2: visual fields full.   CN 3,4,6: extraocular muscles intact.  Pupils are equal, round, reactive bilaterally.    CN5: facial sensation symmetric   CN7: face symmetric without dysarthria  CN8: hearing grossly intact  CN11: trap full strength on shoulder shrug  CN12: tongue midline with protrusion  Strength: good strength in all 4 extremities   Sensory: light touch intact in all 4 extremities.   Cerebellar/coordination: finger nose finger normal without ataxia  Tone: normal in all 4 extremities  Gait: deferred for safety.      Labs  Glucose 85  Na 131  K 5.0  BUN 16  Cr 0.6  Lactic acid 1.5  Ca 8.8    ALT 28  AST 48    WBC 13.7K  Hg 12.5  Platelets 197    UA negative  Blood cultures

## 2024-05-09 NOTE — PROGRESS NOTES
4 Eyes Skin Assessment     NAME:  Margaret Plaza  YOB: 1934  MEDICAL RECORD NUMBER:  [id number]    The patient is being assessed for  Admission    I agree that at least one RN has performed a thorough Head to Toe Skin Assessment on the patient. ALL assessment sites listed below have been assessed.      Areas assessed by both nurses:    Head, Face, Ears, Shoulders, Back, Chest, Arms, Elbows, Hands, Sacrum. Buttock, Coccyx, Ischium, and Legs. Feet and Heels        Does the Patient have a Wound? No noted wound(s)       Hang Prevention initiated by RN: Yes  Wound Care Orders initiated by RN: no    Pressure Injury (Stage 3,4, Unstageable, DTI, NWPT, and Complex wounds) if present, place Wound referral order by RN under : No    New Ostomies, if present place, Ostomy referral order under : No     Nurse 1 eSignature: Electronically signed by Sharron Malhotra RN on 5/9/24 at 11:37 AM EDT    **SHARE this note so that the co-signing nurse can place an eSignature**    Nurse 2 eSignature: Electronically signed by Fede Abbott RN on 5/9/24 at 11:40 AM EDT

## 2024-05-09 NOTE — PROGRESS NOTES
SLP NOTE    SLP eval/tx order received per stroke r/o protocol.  Patient, granddaughter (OT), and son met at bedside. SLP role/evaluation objectives discussed with patient and family; patient denies increased motor speech, receptive/expressive/cognitive language, and/or swallowing difficulties at this time; patient with known stable mild cognitive impairment.  Patient politely requests to hold SLP eval pending continued diagnotic work-up findings.    ST to re-attempt as schedule permits pending identification of patient SLP needs unless otherwise notified.    Thank you.  Brittany Zamorano MA, CCC-SLP, #[id number]  Speech-Language Pathologist  Portable phone: [phone]

## 2024-05-09 NOTE — PROGRESS NOTES
Occupational Therapy Attempt  Margaret Plaza    OT order noted. Pt off floor at ultrasound at time of attempt. Will re-attempt as able.    Electronically signed by Caridad Kim OT on 5/9/24 at 1:49 PM EDT

## 2024-05-09 NOTE — H&P
Hospital Medicine History & Physical      PCP: Janett Castro MD    Date of Admission: 5/9/2024    Date of Service: Pt seen/examined on 05/09/2024 and placed in Observation.    Chief Complaint: Dizziness/vertigo      History Of Present Illness:     89 y.o. female who presented to [facility] with dizziness and vertigo, patient started to have symptoms early this morning, went to bed at 11:30 PM in her normal state, woke at 4 AM noticed dizziness and feeling wobbly, with unsteady gait was not able to collaborate specifically on spinning but stated it was beyond fogginess and likely had spinning, denies loss of consciousness, denies focal muscle weakness no nausea vomiting chest pain palpitation tried to sit down and that did not help significantly, called 911 transferred to ED CT without acute finding of stroke, still denying chest pain shortness of breath nausea vomiting symptoms improved at this time patient placed in observation for further management and treatment discussed with ED provider agree with plan as outlined below    Past Medical History:          Diagnosis Date    Atrial fibrillation (HCC)     Breast cancer (HCC)     right    Cancer (HCC)     Deaf, right     History of gallstones     Thyroid disease        Past Surgical History:          Procedure Laterality Date    ARTHRODESIS Right 12/2/2021    RIGHT HALLUX METATARSOPHALANGEAL JOINT ARTHRODESIS, INTERPHALANGEAL JOINT ARTHRODESIS HALLUX, MULTIPLE METATARSAL OSTEOTOMIES, HAMMER TOE CORRECTION DIGITS 2,4, performed by Deejay Gonzales MD at [facility] OR    BREAST LUMPECTOMY Right     BUNIONECTOMY Right 7/10/2020    MODIFIED NILDA SCARF BUNIONECTOMY RIGHT FOOT, DISTAL METATARSAL OSTEOTOMY SECOND RIGHT, FLEXOR TENOTOMY THIRD AND FOURTH RIGHT, PROXIMAL INTERPROLONGED JOINT ARTHRODISIS RIGHT FOOT performed by Conor Claudio DPM at [facility] OR    COLONOSCOPY      GALLBLADDER SURGERY      HAMMER TOE SURGERY Right 12/2/2021    . performed by Deejay

## 2024-05-09 NOTE — PROGRESS NOTES
Pt is a new admit from ED, pt is A&O, on RA, VSS, pt assit x1, pt states she a little dizzy with movement, skin intact, has some redness to her buttocks, pt has been orientated to the room, call light in reach, family at the bedside, all questions have been answered, will continue to monitor

## 2024-05-09 NOTE — ED TRIAGE NOTES
Pt awoke approximately 0400 this morning to void. Attempted to go to bathroom and felt extremely weak and barely able to walk. Pt states she returned to bed and awoke again just prior to 0600. Pt states the weakness had improved however she is still weaker than normal. Pt states she was very dizzy at the initial 0400 event and minimally dizzy at 0600. Pt currently denies any dizziness, lightheadedness or pain. Pt states she is feeling better now, just weaker than normal.

## 2024-05-09 NOTE — ED PROVIDER NOTES
[facility] EMERGENCY DEPARTMENT  EMERGENCY DEPARTMENT ENCOUNTER        Pt Name: Margaret Plaza  MRN: [id number]  Birthdate 1934  Date of evaluation: 5/9/2024  Provider: Drake Aggarwal PA-C  PCP: Janett Castro MD  Note Started: 6:38 AM EDT 5/9/24      KAILA. I have evaluated this patient.        CHIEF COMPLAINT       Chief Complaint   Patient presents with    Fatigue     Pt states she awoke approximately 0400 this morning and felt very weak and was unable to walk. She went back to sleep and awoke just prior to 0600, felt better but still quite weak. Hx of TIA and Afib       HISTORY OF PRESENT ILLNESS: 1 or more Elements     History From: Patient not            Chief Complaint: Not feeling well since 4 AM    Margaret Plaza is a 89 y.o. female who presents stating that she went to bed last night at 1130 p.m. and was feeling okay at that time.  She woke this morning at 4 AM and noticed that she was feeling very dizzy.  Hard to stay upright because she kept on veering to 1 side or the other.  Had to use her late 's cane.  States that she waited throughout the night and finally decided this morning to call 911.  States that she still feels dizzy even at rest when she is not moving.  No acute vision change or face weakness.  No headache or difficulty breathing.  No extremity acute weakness or loss of sensation or movement or coordination or difficulty speaking or understanding speech.  Positive history of previous TIA, however patient cannot recall a lot of the details of that.  States that still when the ambulance got there she had a hard time standing by herself and had to receive help.  No recent illness or sickness.    Nursing Notes were all reviewed and agreed with or any disagreements were addressed in the HPI.    REVIEW OF SYSTEMS :      Review of Systems  Positive as above  Positives and Pertinent negatives as per HPI.     SURGICAL HISTORY     Past Surgical History:   Procedure  noted below, none     Procedures    CRITICAL CARE TIME (.cctime)   Critical Care  There was a high probability of life-threatening clinical deterioration in the patient's condition requiring my urgent intervention. I personally saw the patient and independently provided greater than 45 minutes of non-concurrent critical care out of the total shared critical care time provided. Critical care required due to patients acute neurologic changes, last known well within 24 hours, code stroke workup begun.  Will need further inpatient evaluation and treatment including MRI.      PAST MEDICAL HISTORY      has a past medical history of Atrial fibrillation (HCC), Breast cancer (HCC), Cancer (HCC), Deaf, right, History of gallstones, and Thyroid disease.     EMERGENCY DEPARTMENT COURSE and DIFFERENTIAL DIAGNOSIS/MDM:   Vitals:    Vitals:    05/09/24 0631 05/09/24 0700 05/09/24 0713 05/09/24 0728   BP: 132/89 (!) 148/77 125/73 (!) 140/67   Pulse: 82 90 88 93   Resp: 14 13 18 18   Temp: 97.9 °F (36.6 °C)      TempSrc: Oral      SpO2: 96% 96% 94% 95%   Weight: 68.2 kg (150 lb 5.7 oz)      Height: 1.575 m (5' 2\")          Patient was given the following medications:  Medications   lactated ringers bolus 1,503 mL (1,503 mLs IntraVENous New Bag 5/9/24 0833)   iopamidol (ISOVUE-370) 76 % injection 75 mL (75 mLs IntraVENous Given 5/9/24 0647)   aspirin chewable tablet 81 mg (81 mg Oral Given 5/9/24 0833)             Is this patient to be included in the SEP-1 Core Measure due to severe sepsis or septic shock?   No   Exclusion criteria - the patient is NOT to be included for SEP-1 Core Measure due to:  May have criteria for sepsis, but does not meet criteria for severe sepsis or septic shock        Chronic Conditions affecting care:    has a past medical history of Atrial fibrillation (HCC), Breast cancer (HCC), Cancer (HCC), Deaf, right, History of gallstones, and Thyroid disease.    CONSULTS: (Who and What was discussed)  IP CONSULT

## 2024-05-10 ENCOUNTER — TELEPHONE (OUTPATIENT)
Dept: CARDIOLOGY CLINIC | Age: 89
End: 2024-05-10

## 2024-05-10 VITALS
DIASTOLIC BLOOD PRESSURE: 74 MMHG | BODY MASS INDEX: 27.55 KG/M2 | OXYGEN SATURATION: 95 % | SYSTOLIC BLOOD PRESSURE: 138 MMHG | HEIGHT: 62 IN | RESPIRATION RATE: 18 BRPM | TEMPERATURE: 98.5 F | HEART RATE: 86 BPM | WEIGHT: 149.69 LBS

## 2024-05-10 DIAGNOSIS — I63.9 CEREBROVASCULAR ACCIDENT (CVA), UNSPECIFIED MECHANISM (HCC): Primary | ICD-10-CM

## 2024-05-10 DIAGNOSIS — G45.9 TIA (TRANSIENT ISCHEMIC ATTACK): ICD-10-CM

## 2024-05-10 LAB
BACTERIA BLD CULT ORG #2: NORMAL
BACTERIA BLD CULT: NORMAL
BASOPHILS # BLD: 0.1 K/UL (ref 0–0.2)
BASOPHILS NFR BLD: 0.6 %
CHOLEST SERPL-MCNC: 173 MG/DL (ref 0–199)
DEPRECATED RDW RBC AUTO: 13.3 % (ref 12.4–15.4)
EOSINOPHIL # BLD: 0.3 K/UL (ref 0–0.6)
EOSINOPHIL NFR BLD: 3.5 %
EST. AVERAGE GLUCOSE BLD GHB EST-MCNC: 105.4 MG/DL
HBA1C MFR BLD: 5.3 %
HCT VFR BLD AUTO: 39.5 % (ref 36–48)
HDLC SERPL-MCNC: 103 MG/DL (ref 40–60)
HGB BLD-MCNC: 13.3 G/DL (ref 12–16)
LDLC SERPL CALC-MCNC: 59 MG/DL
LYMPHOCYTES # BLD: 1.5 K/UL (ref 1–5.1)
LYMPHOCYTES NFR BLD: 16.2 %
MCH RBC QN AUTO: 32.9 PG (ref 26–34)
MCHC RBC AUTO-ENTMCNC: 33.5 G/DL (ref 31–36)
MCV RBC AUTO: 98.1 FL (ref 80–100)
MONOCYTES # BLD: 0.8 K/UL (ref 0–1.3)
MONOCYTES NFR BLD: 9.2 %
NEUTROPHILS # BLD: 6.3 K/UL (ref 1.7–7.7)
NEUTROPHILS NFR BLD: 70.5 %
PLATELET # BLD AUTO: 152 K/UL (ref 135–450)
PLATELET BLD QL SMEAR: ADEQUATE
PMV BLD AUTO: 7.7 FL (ref 5–10.5)
RBC # BLD AUTO: 4.03 M/UL (ref 4–5.2)
SLIDE REVIEW: NORMAL
TRIGL SERPL-MCNC: 56 MG/DL (ref 0–150)
VLDLC SERPL CALC-MCNC: 11 MG/DL
WBC # BLD AUTO: 9 K/UL (ref 4–11)

## 2024-05-10 PROCEDURE — 6370000000 HC RX 637 (ALT 250 FOR IP): Performed by: INTERNAL MEDICINE

## 2024-05-10 PROCEDURE — 85025 COMPLETE CBC W/AUTO DIFF WBC: CPT

## 2024-05-10 PROCEDURE — 2580000003 HC RX 258: Performed by: INTERNAL MEDICINE

## 2024-05-10 PROCEDURE — 9990000010 HC NO CHARGE VISIT

## 2024-05-10 PROCEDURE — 80061 LIPID PANEL: CPT

## 2024-05-10 PROCEDURE — 97530 THERAPEUTIC ACTIVITIES: CPT

## 2024-05-10 PROCEDURE — 36415 COLL VENOUS BLD VENIPUNCTURE: CPT

## 2024-05-10 PROCEDURE — G0378 HOSPITAL OBSERVATION PER HR: HCPCS

## 2024-05-10 PROCEDURE — 94760 N-INVAS EAR/PLS OXIMETRY 1: CPT

## 2024-05-10 PROCEDURE — 97165 OT EVAL LOW COMPLEX 30 MIN: CPT

## 2024-05-10 PROCEDURE — 83036 HEMOGLOBIN GLYCOSYLATED A1C: CPT

## 2024-05-10 RX ADMIN — Medication 100 MCG: at 09:23

## 2024-05-10 RX ADMIN — Medication 10 ML: at 09:24

## 2024-05-10 RX ADMIN — ASPIRIN 81 MG: 81 TABLET, CHEWABLE ORAL at 09:23

## 2024-05-10 RX ADMIN — LEVOTHYROXINE SODIUM 25 MCG: 0.03 TABLET ORAL at 06:24

## 2024-05-10 RX ADMIN — Medication 1 TABLET: at 09:23

## 2024-05-10 RX ADMIN — GABAPENTIN 300 MG: 300 CAPSULE ORAL at 09:23

## 2024-05-10 RX ADMIN — RALOXIFENE HYDROCHLORIDE 60 MG: 60 TABLET, FILM COATED ORAL at 09:23

## 2024-05-10 NOTE — PROGRESS NOTES
SLP NOTE    Chart reviewed and current hospital course discussed with patient and family at bedside. Patient politely requests to continue to hold SLP eval d/t no concerns for changes in baseline swallow, motor speech, receptive/expressive language, and/or cognitive-language status.    ST to discontinue at this time.    Thank you.  Brittany Zamorano MA, CCC-SLP, #[id number]  Speech-Language Pathologist  Portable phone: [phone]

## 2024-05-10 NOTE — PROGRESS NOTES
Occupational Therapy  Facility/Department: [address] PROGRESSIVE CARE  Occupational Therapy Initial Assessment and Discharge      Name: Margaret Plaza  : 1934  MRN: [id number]  Date of Service: 5/10/2024    Discharge Recommendations: Margaret Plaza scored a 24/24 on the AM-PAC ADL Inpatient form. Current research shows that an AM-PAC score of 18 or greater is typically associated with a discharge to the patient's home setting.     If patient discharges prior to next session this note will serve as a discharge summary.  Please see below for the latest assessment towards goals.     Home with assist PRN  OT Equipment Recommendations  Equipment Needed: No       Patient Diagnosis(es): The primary encounter diagnosis was Stroke-like symptoms. Diagnoses of Leukocytosis, unspecified type and Cerebrovascular accident (CVA), unspecified mechanism (HCC) were also pertinent to this visit.  Past Medical History:  has a past medical history of Atrial fibrillation (HCC), Breast cancer (HCC), Cancer (HCC), Deaf, right, History of gallstones, and Thyroid disease.  Past Surgical History:  has a past surgical history that includes Breast lumpectomy (Right); Colonoscopy; Bunionectomy (Right, 7/10/2020); Thyroid surgery; Gallbladder surgery; arthrodesis (Right, 2021); and Hammer toe surgery (Right, 2021).           Assessment   Assessment: PTA pt from home where pt was Ind with mobility and ADLs. Pt currently functioning at/close to baseline completing mobility and transfers with supervision/Ind. Pt with no overt LOB although with some noted sway in gait, with pt able to correct. Pt and family report ambulation appears like pt's baseline. Anticipate pt Ind with all ADLs based on ROM, strength, and balance. Pt with no noted UE deficits including ROM, strength, coordination, or balance. Pt with no ongoing OT needs. Pt reports having all needed AD. Anticipate pt able to return home with PRN assist. D/C from OT.  Prognosis:

## 2024-05-10 NOTE — PROGRESS NOTES
Physical Therapy  PT referral received and chart reviewed.  Per OT, pt up Independent/Supervision only.  No PT needs identified.  PT to sign off at this time.  Mary Crockett, PT

## 2024-05-10 NOTE — PROGRESS NOTES
Neurology    No acute findings on brain MRI.     No new recommendations at this time.      Please refer to consult note from yesterday.     Management of ?atrial fibrillation per primary and/or cardiology.     Call w/ questions.  Thank you.     Celio Fierro NP  [facility] Neurology

## 2024-05-10 NOTE — FLOWSHEET NOTE
05/10/24 1315   Vital Signs   Orthostatic B/P and Pulse? Yes   Blood Pressure Lying 146/79   Pulse Lying 78 PER MINUTE   Blood Pressure Sitting 136/82   Pulse Sitting 72 PER MINUTE   Blood Pressure Standing 123/86   Pulse Standing 88 PER MINUTE

## 2024-05-10 NOTE — PLAN OF CARE
Problem: Discharge Planning  Goal: Discharge to home or other facility with appropriate resources  5/9/2024 2241 by Karina Morrissey, RN  Outcome: Progressing  Flowsheets (Taken 5/9/2024 2005)  Discharge to home or other facility with appropriate resources: Identify barriers to discharge with patient and caregiver  5/9/2024 1549 by Sharron Malhotra, RN  Outcome: Progressing     Problem: Safety - Adult  Goal: Free from fall injury  Outcome: Progressing     Problem: Neurosensory - Adult  Goal: Achieves stable or improved neurological status  Outcome: Progressing  Goal: Achieves maximal functionality and self care  Outcome: Progressing     Problem: Skin/Tissue Integrity - Adult  Goal: Skin integrity remains intact  Outcome: Progressing     Problem: Musculoskeletal - Adult  Goal: Return mobility to safest level of function  Outcome: Progressing  Goal: Return ADL status to a safe level of function  Outcome: Progressing     Problem: Metabolic/Fluid and Electrolytes - Adult  Goal: Electrolytes maintained within normal limits  Outcome: Progressing  Goal: Hemodynamic stability and optimal renal function maintained  Outcome: Progressing  Goal: Glucose maintained within prescribed range  Outcome: Progressing

## 2024-05-10 NOTE — FLOWSHEET NOTE
05/09/24 1918   Vital Signs   Temp 97.8 °F (36.6 °C)   Temp Source Oral   Pulse 94   Heart Rate Source Monitor   Respirations 18   BP (!) 144/70   MAP (Calculated) 95   MAP (mmHg) 91   BP Location Left upper arm   BP Method Automatic   Patient Position Supine     Pt resting comfortably in bed with family at bedside. Call light within reach. No needs expressed at this time. Will continue to monitor.

## 2024-05-10 NOTE — TELEPHONE ENCOUNTER
Pt being discharged and will set up echo as out pt  Per Diane in Echo will  be done 5/15/24 5385    Can you please call scheduling to  set set this up pt is aware of date and time     Ordered placed

## 2024-05-10 NOTE — FLOWSHEET NOTE
05/10/24 0553   Vital Signs   Temp 98.1 °F (36.7 °C)   Temp Source Oral   Pulse 96   Heart Rate Source Monitor   Respirations 16   BP (!) 148/83   MAP (Calculated) 105   MAP (mmHg) 105   BP Location Left Arm   Oxygen Therapy   SpO2 92 %   O2 Device None (Room air)

## 2024-05-10 NOTE — DISCHARGE SUMMARY
Hospital Medicine Discharge Summary    Patient ID: Margaret Plaza      Patient's PCP: Janett Castro MD    Admit Date: 5/9/2024     Discharge Date:   05/10/2024    Admitting Provider: Sid Jay MD     Discharge Provider: Sid Jay MD     Discharge Diagnoses:       Active Hospital Problems    Diagnosis     TIA (transient ischemic attack) [G45.9]        The patient was seen and examined on day of discharge and this discharge summary is in conjunction with any daily progress note from day of discharge.    Hospital Course:     From HPI:\"89 y.o. female who presented to [facility] with dizziness and vertigo, patient started to have symptoms early this morning, went to bed at 11:30 PM in her normal state, woke at 4 AM noticed dizziness and feeling wobbly, with unsteady gait was not able to collaborate specifically on spinning but stated it was beyond fogginess and likely had spinning, denies loss of consciousness, denies focal muscle weakness no nausea vomiting chest pain palpitation tried to sit down and that did not help significantly, called 911 transferred to ED CT without acute finding of stroke, still denying chest pain shortness of breath nausea vomiting symptoms improved at this time patient placed in observation for further management and treatment discussed with ED provider agree with plan as outlined below \"         Dizziness/vertigo spell, likely TIA, aspirin, statin, telemetry, PT OT, MRI brain negative, neurology consulted and doubt TIA, plan of care discussed with patient and family at bedside in details, patient will need to follow-up with PCP and cardiology as outpatient  Thyroid nodule ultrasound ordered and noted, discussed with ENT, discussed with patient and family, please follow-up as outpatient for fine-needle aspiration.  Discussed with patient the absolute need to follow-up as there is need to make sure this is not malignancy she articulate understanding.  Essential

## 2024-05-10 NOTE — PLAN OF CARE
Problem: Discharge Planning  Goal: Discharge to home or other facility with appropriate resources  5/10/2024 1008 by Brigette Paula, RN  Outcome: Progressing     Problem: Neurosensory - Adult  Goal: Achieves stable or improved neurological status  5/10/2024 1008 by Brigette Paula, RN  Outcome: Progressing  Flowsheets (Taken 5/10/2024 0405 by Karina Morrissey, RN)  Achieves stable or improved neurological status: Assess for and report changes in neurological status     Problem: Musculoskeletal - Adult  Goal: Return mobility to safest level of function  5/10/2024 1008 by Brigette Paula, RN  Outcome: Progressing     Problem: Metabolic/Fluid and Electrolytes - Adult  Goal: Hemodynamic stability and optimal renal function maintained  5/10/2024 1008 by Brigette Paula, RN  Outcome: Progressing

## 2024-05-10 NOTE — PROGRESS NOTES
Discharge orders acknowledged by RN . Discharge teaching completed with pt and family. AVS reviewed and all questions answered. Medication regimen reviewed and pt understands schedule. Follow up appointments also reviewed with pt and resources given for discharge. No new medications we prescribed. IV removed. Bedside monitor removed from pt. 60+ minutes of education completed. Required core measures completed. Pt vitals WDL. Pt discharged with all belongings to home. Pt transported off of unit via wheelchair. No complications.         Electronically signed by Brigette Paula RN on 5/10/2024 at 2:37 PM

## 2024-05-13 LAB
BACTERIA BLD CULT ORG #2: NORMAL
BACTERIA BLD CULT: NORMAL

## 2024-05-15 ENCOUNTER — OFFICE VISIT (OUTPATIENT)
Dept: ENT CLINIC | Age: 89
End: 2024-05-15
Payer: COMMERCIAL

## 2024-05-15 ENCOUNTER — HOSPITAL ENCOUNTER (OUTPATIENT)
Age: 89
Discharge: HOME | End: 2024-05-17
Attending: INTERNAL MEDICINE
Payer: COMMERCIAL

## 2024-05-15 VITALS
WEIGHT: 149 LBS | SYSTOLIC BLOOD PRESSURE: 138 MMHG | DIASTOLIC BLOOD PRESSURE: 74 MMHG | HEIGHT: 62 IN | BODY MASS INDEX: 27.42 KG/M2

## 2024-05-15 VITALS
HEIGHT: 62 IN | SYSTOLIC BLOOD PRESSURE: 112 MMHG | BODY MASS INDEX: 27.25 KG/M2 | OXYGEN SATURATION: 93 % | HEART RATE: 107 BPM | DIASTOLIC BLOOD PRESSURE: 71 MMHG

## 2024-05-15 DIAGNOSIS — G45.9 TIA (TRANSIENT ISCHEMIC ATTACK): ICD-10-CM

## 2024-05-15 DIAGNOSIS — E04.2 MULTIPLE THYROID NODULES: Primary | ICD-10-CM

## 2024-05-15 DIAGNOSIS — I63.9 CEREBROVASCULAR ACCIDENT (CVA), UNSPECIFIED MECHANISM (HCC): ICD-10-CM

## 2024-05-15 LAB
ECHO AO ASC DIAM: 3.4 CM
ECHO AO ASCENDING AORTA INDEX: 2.01 CM/M2
ECHO AO ROOT DIAM: 3.2 CM
ECHO AO ROOT INDEX: 1.89 CM/M2
ECHO AV AREA PEAK VELOCITY: 2.3 CM2
ECHO AV AREA VTI: 2.8 CM2
ECHO AV AREA/BSA PEAK VELOCITY: 1.4 CM2/M2
ECHO AV AREA/BSA VTI: 1.7 CM2/M2
ECHO AV MEAN GRADIENT: 3 MMHG
ECHO AV MEAN VELOCITY: 0.8 M/S
ECHO AV PEAK GRADIENT: 6 MMHG
ECHO AV PEAK VELOCITY: 1.2 M/S
ECHO AV VELOCITY RATIO: 0.75
ECHO AV VTI: 25.2 CM
ECHO BSA: 1.72 M2
ECHO EST RA PRESSURE: 8 MMHG
ECHO IVC PROX: 1.8 CM
ECHO LA AREA 2C: 14.8 CM2
ECHO LA AREA 4C: 13.4 CM2
ECHO LA MAJOR AXIS: 5 CM
ECHO LA MINOR AXIS: 5.3 CM
ECHO LA VOL BP: 30 ML (ref 22–52)
ECHO LA VOL MOD A2C: 33 ML (ref 22–52)
ECHO LA VOL MOD A4C: 27 ML (ref 22–52)
ECHO LA VOL/BSA BIPLANE: 18 ML/M2 (ref 16–34)
ECHO LA VOLUME INDEX MOD A2C: 20 ML/M2 (ref 16–34)
ECHO LA VOLUME INDEX MOD A4C: 16 ML/M2 (ref 16–34)
ECHO LV E' LATERAL VELOCITY: 10 CM/S
ECHO LV E' SEPTAL VELOCITY: 6 CM/S
ECHO LV EDV A2C: 71 ML
ECHO LV EDV A4C: 73 ML
ECHO LV EDV INDEX A4C: 43 ML/M2
ECHO LV EDV NDEX A2C: 42 ML/M2
ECHO LV EJECTION FRACTION A2C: 68 %
ECHO LV EJECTION FRACTION A4C: 56 %
ECHO LV EJECTION FRACTION BIPLANE: 63 % (ref 55–100)
ECHO LV ESV A2C: 23 ML
ECHO LV ESV A4C: 32 ML
ECHO LV ESV INDEX A2C: 14 ML/M2
ECHO LV ESV INDEX A4C: 19 ML/M2
ECHO LV FRACTIONAL SHORTENING: 32 % (ref 28–44)
ECHO LV INTERNAL DIMENSION DIASTOLE INDEX: 2.19 CM/M2
ECHO LV INTERNAL DIMENSION DIASTOLIC: 3.7 CM (ref 3.9–5.3)
ECHO LV INTERNAL DIMENSION SYSTOLIC INDEX: 1.48 CM/M2
ECHO LV INTERNAL DIMENSION SYSTOLIC: 2.5 CM
ECHO LV IVSD: 0.9 CM (ref 0.6–0.9)
ECHO LV MASS 2D: 104.6 G (ref 67–162)
ECHO LV MASS INDEX 2D: 61.9 G/M2 (ref 43–95)
ECHO LV POSTERIOR WALL DIASTOLIC: 1 CM (ref 0.6–0.9)
ECHO LV RELATIVE WALL THICKNESS RATIO: 0.54
ECHO LVOT AREA: 3.1 CM2
ECHO LVOT AV VTI INDEX: 0.89
ECHO LVOT DIAM: 2 CM
ECHO LVOT MEAN GRADIENT: 2 MMHG
ECHO LVOT PEAK GRADIENT: 3 MMHG
ECHO LVOT PEAK VELOCITY: 0.9 M/S
ECHO LVOT STROKE VOLUME INDEX: 41.6 ML/M2
ECHO LVOT SV: 70.3 ML
ECHO LVOT VTI: 22.4 CM
ECHO MV A VELOCITY: 1 M/S
ECHO MV AREA VTI: 3.2 CM2
ECHO MV E DECELERATION TIME (DT): 203 MS
ECHO MV E VELOCITY: 0.74 M/S
ECHO MV E/A RATIO: 0.74
ECHO MV E/E' LATERAL: 7.4
ECHO MV E/E' RATIO (AVERAGED): 9.87
ECHO MV E/E' SEPTAL: 12.33
ECHO MV LVOT VTI INDEX: 1
ECHO MV MAX VELOCITY: 0.9 M/S
ECHO MV MEAN GRADIENT: 2 MMHG
ECHO MV MEAN VELOCITY: 0.6 M/S
ECHO MV PEAK GRADIENT: 3 MMHG
ECHO MV VTI: 22.3 CM
ECHO PV MAX VELOCITY: 0.6 M/S
ECHO PV PEAK GRADIENT: 2 MMHG
ECHO RA AREA 4C: 16.6 CM2
ECHO RA END SYSTOLIC VOLUME APICAL 4 CHAMBER INDEX BSA: 23 ML/M2
ECHO RA VOLUME: 39 ML
ECHO RIGHT VENTRICULAR SYSTOLIC PRESSURE (RVSP): 13 MMHG
ECHO RV BASAL DIMENSION: 3.6 CM
ECHO RV FREE WALL PEAK S': 10 CM/S
ECHO RV LONGITUDINAL DIMENSION: 6.5 CM
ECHO RV MID DIMENSION: 2.1 CM
ECHO RV TAPSE: 1.9 CM (ref 1.7–?)
ECHO TV REGURGITANT MAX VELOCITY: 1.11 M/S
ECHO TV REGURGITANT PEAK GRADIENT: 5 MMHG

## 2024-05-15 PROCEDURE — 99203 OFFICE O/P NEW LOW 30 MIN: CPT | Performed by: OTOLARYNGOLOGY

## 2024-05-15 PROCEDURE — 1090F PRES/ABSN URINE INCON ASSESS: CPT | Performed by: OTOLARYNGOLOGY

## 2024-05-15 PROCEDURE — 1123F ACP DISCUSS/DSCN MKR DOCD: CPT | Performed by: OTOLARYNGOLOGY

## 2024-05-15 PROCEDURE — G8419 CALC BMI OUT NRM PARAM NOF/U: HCPCS | Performed by: OTOLARYNGOLOGY

## 2024-05-15 PROCEDURE — 4004F PT TOBACCO SCREEN RCVD TLK: CPT | Performed by: OTOLARYNGOLOGY

## 2024-05-15 PROCEDURE — G8427 DOCREV CUR MEDS BY ELIG CLIN: HCPCS | Performed by: OTOLARYNGOLOGY

## 2024-05-15 PROCEDURE — 93306 TTE W/DOPPLER COMPLETE: CPT

## 2024-05-15 NOTE — PROGRESS NOTES
[facility]  DIVISION OF OTOLARYNGOLOGY- HEAD & NECK SURGERY  CONSULT      Patient Name: Margaret Plaza  Medical Record Number:  [id number]  Primary Care Physician:  Janett Castro MD  Date of Consultation: 5/15/2024    Chief Complaint: Thyroid nodules        HISTORY OF PRESENT ILLNESS  Margaret is a(n) 89 y.o. female who presents for evaluation of thyroid nodules.  The patient was admitted to the hospital on May 9 and the CTA showed some incidental right-sided thyroid nodules.  The patient was unaware she had the thyroid nodules.  She says that she thinks she had her thyroid removed a long time ago.  She is on Synthroid, but a low dose.  She denies compressive symptoms.  She does not have a family history of thyroid cancer.  She does have a history of breast cancer in the 1980s and did receive radiation for this.            REVIEW OF SYSTEMS  As above    PHYSICAL EXAM  GENERAL: No Acute Distress, Alert and Oriented, no Hoarseness, strong voice  EYES: EOMI, Anti-icteric  HENT:   Head: Normocephalic and atraumatic.   Face:  Symmetric, facial nerve intact, no sinus tenderness  Right Ear: Normal external ear, normal external auditory canal, intact tympanic membrane with normal mobility and aerated middle ear  Left Ear: Normal external ear, normal external auditory canal, intact tympanic membrane with normal mobility and aerated middle ear  Mouth/Oral Cavity:  normal lips, Uvula is midline, no mucosal lesions, no trismus  Oropharynx/Larynx:  normal oropharynx,  Nose:Normal external nasal appearance.    NECK: Palpable right-sided thyroid nodules.  She does have scar in the region I expect for thyroidectomy        RADIOLOGY  Summary of findings:  Thyroid ultrasound from last week shows multiple thyroid nodules.  He has a 1.6 cm right thyroid nodule, a 1.2 cm right thyroid nodule, a 1.9 cm right thyroid nodule and a 2.0 cm right thyroid nodule.  FNA was recommended for 2 of the nodules.    I reviewed the patient's CTA.

## (undated) DEVICE — BIT DRL L70/39MM DIA1.1MM FOR PILOT H JCBS CHK 1.5MM SCR

## (undated) DEVICE — BANDAGE,ELASTIC,ESMARK,STERILE,6"X9',LF: Brand: MEDLINE

## (undated) DEVICE — BIT DRL L300MM DIA5MM CANN QUIK CPL ADJ STP REUSE

## (undated) DEVICE — STATION ISOLATN W1775XH205IN D675IN ICU WALL OR GLS MT P2

## (undated) DEVICE — 3M™ COBAN™ NL STERILE NON-LATEX SELF-ADHERENT WRAP, 2083S, 3 IN X 5 YD (7,5 CM X 4,5 M), 24 ROLLS/CASE: Brand: 3M™ COBAN™

## (undated) DEVICE — C-ARM PACK: Brand: C-ARM COVER

## (undated) DEVICE — SUTURE VCRL SZ 3-0 L27IN ABSRB UD L26MM SH 1/2 CIR J416H

## (undated) DEVICE — Device

## (undated) DEVICE — INTENDED FOR TISSUE SEPARATION, AND OTHER PROCEDURES THAT REQUIRE A SHARP SURGICAL BLADE TO PUNCTURE OR CUT.: Brand: BARD-PARKER ® STAINLESS STEEL BLADES

## (undated) DEVICE — GLOVE SURG SZ 8 CRM LTX FREE POLYISOPRENE POLYMER BEAD ANTI

## (undated) DEVICE — SUTURE SZ 0 27IN 5/8 CIR UR-6  TAPER PT VIOLET ABSRB VICRYL J603H

## (undated) DEVICE — COTTON UNDERCAST PADDING,CRIMPED FINISH: Brand: WEBRIL

## (undated) DEVICE — THIN OFFSET (9.0 X 0.38 X 25.0MM)

## (undated) DEVICE — STOCKINETTE ORTH CAST UNIV 5 FTX4 IN 2 PLY TBLR RAYON

## (undated) DEVICE — BANDAGE,GAUZE,BULKEE II,4.5"X4.1YD,STRL: Brand: MEDLINE

## (undated) DEVICE — BANDAGE,GAUZE,CONFORMING,3"X75",STRL,LF: Brand: MEDLINE

## (undated) DEVICE — SPLINT QUICK STEP SCOTCHCAST 5 X 30

## (undated) DEVICE — SUTURE ETHLN SZ 3-0 L18IN NONABSORBABLE BLK PS-2 L19MM 3/8 1669H

## (undated) DEVICE — SYRINGE MED 10ML LUERLOCK TIP W/O SFTY DISP

## (undated) DEVICE — GLOVE SURG SZ 85 L1185IN FNGR THK75MIL STRW LTX POLYMER

## (undated) DEVICE — SINGLE USE DEVICE INTENDED TO COVER EXPOSED ENDS OF ORTHOPEDIC PIN AND K-WIRES TO HELP PROTECT THE EXPOSED WIRE FROM SNAGGING ON CLOTHING.: Brand: OXBORO™ PIN COVER

## (undated) DEVICE — PADDING,UNDERCAST,COTTON, 4"X4YD STERILE: Brand: MEDLINE

## (undated) DEVICE — SOLUTION IV 1000ML 0.9% SOD CHL

## (undated) DEVICE — BIT DRL OD2MM ANK CANN DISP FOR INTOSS FIX IOFIX +

## (undated) DEVICE — MERCY HEALTH WEST TURNOVER: Brand: MEDLINE INDUSTRIES, INC.

## (undated) DEVICE — SYRINGE IRRIG 60ML SFT PLIABLE BLB EZ TO GRP 1 HND USE W/

## (undated) DEVICE — C-ARM: Brand: UNBRANDED

## (undated) DEVICE — PRECISION OFFSET (5.5 X 0.51 X 18.0MM)

## (undated) DEVICE — SUTURE VCRL SZ 4-0 L18IN ABSRB UD L19MM PS-2 3/8 CIR PRIM J496H

## (undated) DEVICE — 3M™ STERI-DRAPE™ U-DRAPE 1015: Brand: STERI-DRAPE™

## (undated) DEVICE — COVER LT HNDL BLU PLAS

## (undated) DEVICE — SPONGE GZ W4XL4IN COT 12 PLY TYP VII WVN C FLD DSGN

## (undated) DEVICE — MAJOR SET UP PK

## (undated) DEVICE — SUTURE MCRYL SZ 4-0 L18IN ABSRB UD L19MM PS-2 3/8 CIR PRIM Y496G

## (undated) DEVICE — STRIP,CLOSURE,WOUND,MEDI-STRIP,1/2X4: Brand: MEDLINE

## (undated) DEVICE — PENCIL ES L3M BTTN SWCH S STL HEX LOK BLDE ELECTRD HOLSTER

## (undated) DEVICE — DRILL BIT: Brand: CHARLOTTE

## (undated) DEVICE — FOOT SWITCH DRAPE: Brand: UNBRANDED

## (undated) DEVICE — CANISTER, RIGID, 1200CC: Brand: MEDLINE INDUSTRIES, INC.

## (undated) DEVICE — CHLORAPREP 26ML ORANGE

## (undated) DEVICE — BOWL ASSY BM210 DUAL BLADE DISPOSABLE: Brand: MIDAS REX™

## (undated) DEVICE — ZIMMER® STERILE DISPOSABLE TOURNIQUET CUFF WITH PLC, DUAL PORT, SINGLE BLADDER, 18 IN. (46 CM)

## (undated) DEVICE — GLOVE SURG SZ 65 THK91MIL LTX FREE SYN POLYISOPRENE

## (undated) DEVICE — ELECTRODE PT RET AD L9FT HI MOIST COND ADH HYDRGEL CORDED

## (undated) DEVICE — GLOVE SURG SZ 75 CRM LTX FREE POLYISOPRENE POLYMER BEAD ANTI

## (undated) DEVICE — NEEDLE HYPO 18GA L1.5IN THN WALL PIVOTING SHLD BVL ORIENTED

## (undated) DEVICE — PAD,ABDOMINAL,8"X7.5",STERILE,LF,1/PK: Brand: MEDLINE

## (undated) DEVICE — SYRINGE MED 3ML CLR PLAS STD N CTRL LUERLOCK TIP DISP

## (undated) DEVICE — ROLL COT W11.5INXL11FT 1LB HIGHLY ABSRB SURG GRD

## (undated) DEVICE — TOWEL,STOP FLAG GOLD N-W: Brand: MEDLINE

## (undated) DEVICE — GOWN,SIRUS,POLYRNF,BRTHSLV,XL,30/CS: Brand: MEDLINE

## (undated) DEVICE — CARBIDE BUR,ROUND CUTTING, 10 FLUTES, MED., 5MM DIA.: Brand: MICROAIRE®

## (undated) DEVICE — PROTECTOR ULN NRV PUR FOAM HK LOOP STRP ANATOMICALLY

## (undated) DEVICE — SUTURE ETHLN SZ 3-0 L18IN NONABSORBABLE BLK FS-1 L24MM 3/8 663H

## (undated) DEVICE — COVER,TABLE,HEAVY DUTY,77"X90",STRL: Brand: MEDLINE

## (undated) DEVICE — GUIDEWIRE ORTH L300MM DIA2.8MM S STL THRD SHRP TRCR TIP FOR

## (undated) DEVICE — 3M™ IOBAN™ 2 ANTIMICROBIAL INCISE DRAPE 6640EZ: Brand: IOBAN™ 2

## (undated) DEVICE — MUC CANNULATED HEAD DRILL: Brand: CHARLOTTE

## (undated) DEVICE — SOLUTION IV IRRIG POUR BRL 0.9% SODIUM CHL 2F7124

## (undated) DEVICE — SYRINGE 20ML LL S/C 50

## (undated) DEVICE — TUBING, SUCTION, 1/4" X 12', STRAIGHT: Brand: MEDLINE

## (undated) DEVICE — NEEDLE HYPO 25GA L1.5IN BVL ORIENTED ECLIPSE

## (undated) DEVICE — SPLINT CAST 4INX15FT ORTHO GLS

## (undated) DEVICE — 3M™ STERI-STRIP™ COMPOUND BENZOIN TINCTURE 40 BAGS/CARTON 4 CARTONS/CASE C1544: Brand: 3M™ STERI-STRIP™

## (undated) DEVICE — PRECISION (9.0 X 0.51 X 25.0MM)

## (undated) DEVICE — TOWEL,OR,DSP,ST,BLUE,DLX,8/PK,10PK/CS: Brand: MEDLINE

## (undated) DEVICE — PRECISION THIN (5.5 X 0.38 X 18.0MM)

## (undated) DEVICE — PRECISION OFFSET (9.0 X 0.64 X 35.0MM)

## (undated) DEVICE — DRESSING,GAUZE,XEROFORM,CURAD,1"X8",ST: Brand: CURAD

## (undated) DEVICE — BANDAGE COBAN 4 IN COMPR W4INXL5YD FOAM COHESIVE QUIK STK SELF ADH SFT

## (undated) DEVICE — MINOR SET UP PK

## (undated) DEVICE — SUTURE VCRL SZ 3-0 L27IN ABSRB UD L19MM PS-2 3/8 CIR PRIM J427H

## (undated) DEVICE — STERILE POLYISOPRENE POWDER-FREE SURGICAL GLOVES WITH EMOLLIENT COATING: Brand: PROTEXIS

## (undated) DEVICE — PADDING CAST W4INXL4YD HIGHLY ABSRB THAN COT EZ APPL

## (undated) DEVICE — PODIATRY: Brand: MEDLINE INDUSTRIES, INC.

## (undated) DEVICE — BANDAGE COMPR W4INXL12FT E DISP ESMARCH EVEN

## (undated) DEVICE — SHEET,DRAPE,53X77,STERILE: Brand: MEDLINE

## (undated) DEVICE — T-DRAPE,EXTREMITY,STERILE: Brand: MEDLINE

## (undated) DEVICE — CONNECTOR TBNG WHT PLAS SUCT STR 5IN1 LTWT W/ M CONN